# Patient Record
Sex: MALE | Race: WHITE | Employment: OTHER | URBAN - METROPOLITAN AREA
[De-identification: names, ages, dates, MRNs, and addresses within clinical notes are randomized per-mention and may not be internally consistent; named-entity substitution may affect disease eponyms.]

---

## 2017-01-26 LAB
LEFT VENTRICULAR EJECTION FRACTION MODE: NORMAL
LV EF: 73 %

## 2018-10-16 ENCOUNTER — OFFICE VISIT (OUTPATIENT)
Dept: CARDIOLOGY CLINIC | Age: 68
End: 2018-10-16
Payer: MEDICARE

## 2018-10-16 VITALS
HEIGHT: 67 IN | DIASTOLIC BLOOD PRESSURE: 70 MMHG | WEIGHT: 170 LBS | BODY MASS INDEX: 26.68 KG/M2 | SYSTOLIC BLOOD PRESSURE: 132 MMHG | RESPIRATION RATE: 16 BRPM | HEART RATE: 51 BPM

## 2018-10-16 DIAGNOSIS — I25.10 CORONARY ARTERY DISEASE INVOLVING NATIVE CORONARY ARTERY OF NATIVE HEART WITHOUT ANGINA PECTORIS: Primary | ICD-10-CM

## 2018-10-16 PROCEDURE — 1101F PT FALLS ASSESS-DOCD LE1/YR: CPT | Performed by: NURSE PRACTITIONER

## 2018-10-16 PROCEDURE — 4040F PNEUMOC VAC/ADMIN/RCVD: CPT | Performed by: NURSE PRACTITIONER

## 2018-10-16 PROCEDURE — 1036F TOBACCO NON-USER: CPT | Performed by: NURSE PRACTITIONER

## 2018-10-16 PROCEDURE — G8598 ASA/ANTIPLAT THER USED: HCPCS | Performed by: NURSE PRACTITIONER

## 2018-10-16 PROCEDURE — 93000 ELECTROCARDIOGRAM COMPLETE: CPT | Performed by: NURSE PRACTITIONER

## 2018-10-16 PROCEDURE — G8484 FLU IMMUNIZE NO ADMIN: HCPCS | Performed by: NURSE PRACTITIONER

## 2018-10-16 PROCEDURE — G8427 DOCREV CUR MEDS BY ELIG CLIN: HCPCS | Performed by: NURSE PRACTITIONER

## 2018-10-16 PROCEDURE — 1123F ACP DISCUSS/DSCN MKR DOCD: CPT | Performed by: NURSE PRACTITIONER

## 2018-10-16 PROCEDURE — G8419 CALC BMI OUT NRM PARAM NOF/U: HCPCS | Performed by: NURSE PRACTITIONER

## 2018-10-16 PROCEDURE — 3017F COLORECTAL CA SCREEN DOC REV: CPT | Performed by: NURSE PRACTITIONER

## 2018-10-16 PROCEDURE — 99213 OFFICE O/P EST LOW 20 MIN: CPT | Performed by: NURSE PRACTITIONER

## 2019-01-29 ENCOUNTER — HOSPITAL ENCOUNTER (EMERGENCY)
Age: 69
Discharge: HOME | End: 2019-01-29
Attending: EMERGENCY MEDICINE
Payer: MEDICARE

## 2019-01-29 ENCOUNTER — APPOINTMENT (OUTPATIENT)
Dept: GENERAL RADIOLOGY | Age: 69
End: 2019-01-29
Payer: MEDICARE

## 2019-01-29 VITALS
BODY MASS INDEX: 26.37 KG/M2 | DIASTOLIC BLOOD PRESSURE: 69 MMHG | RESPIRATION RATE: 16 BRPM | HEART RATE: 54 BPM | HEIGHT: 67 IN | OXYGEN SATURATION: 97 % | TEMPERATURE: 96.8 F | WEIGHT: 168 LBS | SYSTOLIC BLOOD PRESSURE: 124 MMHG

## 2019-01-29 DIAGNOSIS — R07.89 CHEST WALL PAIN: Primary | ICD-10-CM

## 2019-01-29 LAB
ALBUMIN SERPL-MCNC: 4.4 G/DL (ref 3.5–5.2)
ALP BLD-CCNC: 52 U/L (ref 40–129)
ALT SERPL-CCNC: 23 U/L (ref 0–40)
ANION GAP SERPL CALCULATED.3IONS-SCNC: 8 MMOL/L (ref 7–16)
AST SERPL-CCNC: 23 U/L (ref 0–39)
BASOPHILS ABSOLUTE: 0.04 E9/L (ref 0–0.2)
BASOPHILS RELATIVE PERCENT: 0.5 % (ref 0–2)
BILIRUB SERPL-MCNC: 0.7 MG/DL (ref 0–1.2)
BUN BLDV-MCNC: 17 MG/DL (ref 8–23)
CALCIUM SERPL-MCNC: 9.1 MG/DL (ref 8.6–10.2)
CHLORIDE BLD-SCNC: 97 MMOL/L (ref 98–107)
CO2: 30 MMOL/L (ref 22–29)
CREAT SERPL-MCNC: 1.3 MG/DL (ref 0.7–1.2)
EKG ATRIAL RATE: 60 BPM
EKG P AXIS: 81 DEGREES
EKG P-R INTERVAL: 168 MS
EKG Q-T INTERVAL: 390 MS
EKG QRS DURATION: 92 MS
EKG QTC CALCULATION (BAZETT): 390 MS
EKG R AXIS: 66 DEGREES
EKG T AXIS: 82 DEGREES
EKG VENTRICULAR RATE: 60 BPM
EOSINOPHILS ABSOLUTE: 0.28 E9/L (ref 0.05–0.5)
EOSINOPHILS RELATIVE PERCENT: 3.3 % (ref 0–6)
GFR AFRICAN AMERICAN: >60
GFR NON-AFRICAN AMERICAN: 55 ML/MIN/1.73
GLUCOSE BLD-MCNC: 86 MG/DL (ref 74–99)
HCT VFR BLD CALC: 44.2 % (ref 37–54)
HEMOGLOBIN: 14.8 G/DL (ref 12.5–16.5)
IMMATURE GRANULOCYTES #: 0.03 E9/L
IMMATURE GRANULOCYTES %: 0.4 % (ref 0–5)
LYMPHOCYTES ABSOLUTE: 1.93 E9/L (ref 1.5–4)
LYMPHOCYTES RELATIVE PERCENT: 22.5 % (ref 20–42)
MAGNESIUM: 2.2 MG/DL (ref 1.6–2.6)
MCH RBC QN AUTO: 28.4 PG (ref 26–35)
MCHC RBC AUTO-ENTMCNC: 33.5 % (ref 32–34.5)
MCV RBC AUTO: 84.8 FL (ref 80–99.9)
MONOCYTES ABSOLUTE: 0.86 E9/L (ref 0.1–0.95)
MONOCYTES RELATIVE PERCENT: 10 % (ref 2–12)
NEUTROPHILS ABSOLUTE: 5.43 E9/L (ref 1.8–7.3)
NEUTROPHILS RELATIVE PERCENT: 63.3 % (ref 43–80)
PDW BLD-RTO: 12.5 FL (ref 11.5–15)
PLATELET # BLD: 230 E9/L (ref 130–450)
PMV BLD AUTO: 9.1 FL (ref 7–12)
POTASSIUM SERPL-SCNC: 4 MMOL/L (ref 3.5–5)
RBC # BLD: 5.21 E12/L (ref 3.8–5.8)
SODIUM BLD-SCNC: 135 MMOL/L (ref 132–146)
TOTAL PROTEIN: 6.8 G/DL (ref 6.4–8.3)
TROPONIN: <0.01 NG/ML (ref 0–0.03)
WBC # BLD: 8.6 E9/L (ref 4.5–11.5)

## 2019-01-29 PROCEDURE — 6370000000 HC RX 637 (ALT 250 FOR IP): Performed by: NURSE PRACTITIONER

## 2019-01-29 PROCEDURE — 36415 COLL VENOUS BLD VENIPUNCTURE: CPT

## 2019-01-29 PROCEDURE — 80053 COMPREHEN METABOLIC PANEL: CPT

## 2019-01-29 PROCEDURE — 83735 ASSAY OF MAGNESIUM: CPT

## 2019-01-29 PROCEDURE — 71046 X-RAY EXAM CHEST 2 VIEWS: CPT

## 2019-01-29 PROCEDURE — 99285 EMERGENCY DEPT VISIT HI MDM: CPT

## 2019-01-29 PROCEDURE — 6360000002 HC RX W HCPCS: Performed by: EMERGENCY MEDICINE

## 2019-01-29 PROCEDURE — 85025 COMPLETE CBC W/AUTO DIFF WBC: CPT

## 2019-01-29 PROCEDURE — 96374 THER/PROPH/DIAG INJ IV PUSH: CPT

## 2019-01-29 PROCEDURE — 84484 ASSAY OF TROPONIN QUANT: CPT

## 2019-01-29 PROCEDURE — 93005 ELECTROCARDIOGRAM TRACING: CPT | Performed by: NURSE PRACTITIONER

## 2019-01-29 RX ORDER — ASPIRIN 81 MG/1
243 TABLET, CHEWABLE ORAL ONCE
Status: COMPLETED | OUTPATIENT
Start: 2019-01-29 | End: 2019-01-29

## 2019-01-29 RX ORDER — KETOROLAC TROMETHAMINE 30 MG/ML
15 INJECTION, SOLUTION INTRAMUSCULAR; INTRAVENOUS ONCE
Status: COMPLETED | OUTPATIENT
Start: 2019-01-29 | End: 2019-01-29

## 2019-01-29 RX ADMIN — KETOROLAC TROMETHAMINE 15 MG: 30 INJECTION, SOLUTION INTRAMUSCULAR; INTRAVENOUS at 20:27

## 2019-01-29 RX ADMIN — ASPIRIN 81 MG 243 MG: 81 TABLET ORAL at 18:48

## 2019-01-29 ASSESSMENT — PAIN SCALES - GENERAL
PAINLEVEL_OUTOF10: 6
PAINLEVEL_OUTOF10: 3
PAINLEVEL_OUTOF10: 6

## 2019-01-29 ASSESSMENT — PAIN DESCRIPTION - LOCATION: LOCATION: CHEST

## 2019-01-29 ASSESSMENT — PAIN DESCRIPTION - PAIN TYPE: TYPE: ACUTE PAIN

## 2019-01-29 ASSESSMENT — PAIN DESCRIPTION - DESCRIPTORS: DESCRIPTORS: ACHING;DISCOMFORT

## 2019-05-08 ENCOUNTER — HOSPITAL ENCOUNTER (EMERGENCY)
Age: 69
Discharge: HOME | End: 2019-05-08
Attending: EMERGENCY MEDICINE
Payer: MEDICARE

## 2019-05-08 ENCOUNTER — APPOINTMENT (OUTPATIENT)
Dept: ULTRASOUND IMAGING | Age: 69
End: 2019-05-08
Payer: MEDICARE

## 2019-05-08 VITALS
HEART RATE: 59 BPM | OXYGEN SATURATION: 95 % | TEMPERATURE: 97.7 F | DIASTOLIC BLOOD PRESSURE: 80 MMHG | SYSTOLIC BLOOD PRESSURE: 165 MMHG | RESPIRATION RATE: 16 BRPM

## 2019-05-08 DIAGNOSIS — N43.3 HYDROCELE IN ADULT: Primary | ICD-10-CM

## 2019-05-08 DIAGNOSIS — T14.8XXA MUSCLE STRAIN: ICD-10-CM

## 2019-05-08 DIAGNOSIS — N50.812 TESTICULAR PAIN, LEFT: ICD-10-CM

## 2019-05-08 LAB
BILIRUBIN URINE: NEGATIVE
BLOOD, URINE: NEGATIVE
CLARITY: CLEAR
COLOR: YELLOW
GLUCOSE URINE: NEGATIVE MG/DL
KETONES, URINE: NEGATIVE MG/DL
LEUKOCYTE ESTERASE, URINE: NEGATIVE
NITRITE, URINE: NEGATIVE
PH UA: 5.5 (ref 5–9)
PROTEIN UA: NEGATIVE MG/DL
SPECIFIC GRAVITY UA: 1.01 (ref 1–1.03)
UROBILINOGEN, URINE: 0.2 E.U./DL

## 2019-05-08 PROCEDURE — 76870 US EXAM SCROTUM: CPT

## 2019-05-08 PROCEDURE — 99284 EMERGENCY DEPT VISIT MOD MDM: CPT

## 2019-05-08 PROCEDURE — 81003 URINALYSIS AUTO W/O SCOPE: CPT

## 2019-05-08 RX ORDER — IBUPROFEN 600 MG/1
600 TABLET ORAL EVERY 6 HOURS PRN
Qty: 360 TABLET | Refills: 0 | Status: SHIPPED | OUTPATIENT
Start: 2019-05-08 | End: 2022-02-25

## 2019-05-08 ASSESSMENT — ENCOUNTER SYMPTOMS
DIARRHEA: 0
ABDOMINAL PAIN: 0
SHORTNESS OF BREATH: 0
RESPIRATORY NEGATIVE: 1
COUGH: 0
GASTROINTESTINAL NEGATIVE: 1
VOMITING: 0
SORE THROAT: 0
NAUSEA: 0
EYES NEGATIVE: 1
CONSTIPATION: 0

## 2019-05-08 ASSESSMENT — PAIN DESCRIPTION - FREQUENCY: FREQUENCY: INTERMITTENT

## 2019-05-08 ASSESSMENT — PAIN SCALES - GENERAL: PAINLEVEL_OUTOF10: 4

## 2019-05-08 ASSESSMENT — PAIN DESCRIPTION - PAIN TYPE: TYPE: ACUTE PAIN

## 2019-05-08 ASSESSMENT — PAIN DESCRIPTION - DESCRIPTORS: DESCRIPTORS: DULL

## 2019-05-08 ASSESSMENT — PAIN DESCRIPTION - LOCATION: LOCATION: GROIN

## 2019-05-08 ASSESSMENT — PAIN DESCRIPTION - ORIENTATION: ORIENTATION: LEFT

## 2019-05-08 NOTE — ED PROVIDER NOTES
This is 79years old male who presented to our emergency room with a chief complaint of left testicular and left groin pain or pass several days. Patient stated he was working in his backyard moving swimming pool equipment prior to onset of symptoms. Patient denies any other trauma. Patient denies any direct injury to genitourinary testicular area. She denies urinary symptoms. No penile discharge. No fever or vomiting. No constipation or diarrhea. No abdominal pain. Patient never had testicular pain in the past.  No history of testicular cancer. The history is provided by the patient. No  was used. Testicle Pain    The current episode started 6 to 7 days ago. The onset was gradual. The problem occurs rarely. The problem has been unchanged. The problem affects the left side. There is no reported injury. The color of the testicles is normal. There was no injury mechanism. The pain is mild. Nothing relieves the symptoms. Nothing aggravates the symptoms. Associated symptoms include testicular pain. Pertinent negatives include no chest pain, no anorexia, no chills, no fever, no abdominal pain, no constipation, no diarrhea, no nausea, no vomiting, no discolored urine, no dysuria, no frequency, no genital lesions, no hematuria, no hesitancy, no painful intercourse, no penile discharge, no penile pain, no urgency, no urinary burning, no urinary retention, no headaches, no sore throat, no flank pain, no joint pain, no joint swelling, no cough, no shortness of breath and no rash. He is experiencing no difficulties urinating. He is not sexually active. His past medical history does not include STD, testicular torsion, torsion of appendix testis, epididymitis, hydrocele, varicocele, inguinal hernia, femoral hernia, cryptorchidism, UTI or gallstones. There were no sick contacts. He has received no recent medical care. Review of Systems   Constitutional: Negative for chills and fever. HENT: Negative. Negative for sore throat. Eyes: Negative. Respiratory: Negative. Negative for cough and shortness of breath. Cardiovascular: Negative. Negative for chest pain. Gastrointestinal: Negative. Negative for abdominal pain, anorexia, constipation, diarrhea, nausea and vomiting. Genitourinary: Positive for testicular pain. Negative for dysuria, flank pain, frequency, hematuria, hesitancy, penile discharge, penile pain and urgency. Musculoskeletal: Negative. Negative for joint pain. Skin: Negative for rash. Neurological: Negative. Negative for headaches. Psychiatric/Behavioral: Negative. All other systems reviewed and are negative. Physical Exam   Constitutional: He is oriented to person, place, and time. He appears well-developed and well-nourished. He appears distressed (Mild). HENT:   Head: Normocephalic and atraumatic. Mouth/Throat: No oropharyngeal exudate. Eyes: Pupils are equal, round, and reactive to light. Conjunctivae and EOM are normal. No scleral icterus. Neck: Normal range of motion. Neck supple. Cardiovascular: Normal rate, regular rhythm and normal heart sounds. Pulmonary/Chest: Effort normal and breath sounds normal. No stridor. No respiratory distress. Abdominal: Soft. Bowel sounds are normal. He exhibits no distension. There is no tenderness. There is no guarding. Genitourinary:   Genitourinary Comments: Positive mild tenderness upon palpation over epididymis and also left testicle. As far as I could tell and examined I did not notice any scrotal hernia. No signs of rash. Musculoskeletal: Normal range of motion. He exhibits no edema, tenderness or deformity. Neurological: He is alert and oriented to person, place, and time. He displays normal reflexes. No cranial nerve deficit. Coordination normal.   Skin: Skin is warm and dry. Capillary refill takes less than 2 seconds. No rash noted. He is not diaphoretic. No erythema. No pallor. has a 42.00 pack-year smoking history. He quit smokeless tobacco use about 34 years ago. His smokeless tobacco use included chew. He reports that he drinks about 8.4 oz of alcohol per week. He reports that he does not use drugs. Family History: family history includes Alzheimer's Disease in his mother; Cancer in his father; Hypertension in his father. The patients home medications have been reviewed. Allergies: Corticosteroids    -------------------------------------------------- RESULTS -------------------------------------------------  Labs:  Results for orders placed or performed during the hospital encounter of 05/08/19   Urinalysis, reflex to microscopic   Result Value Ref Range    Color, UA Yellow Straw/Yellow    Clarity, UA Clear Clear    Glucose, Ur Negative Negative mg/dL    Bilirubin Urine Negative Negative    Ketones, Urine Negative Negative mg/dL    Specific Gravity, UA 1.010 1.005 - 1.030    Blood, Urine Negative Negative    pH, UA 5.5 5.0 - 9.0    Protein, UA Negative Negative mg/dL    Urobilinogen, Urine 0.2 <2.0 E.U./dL    Nitrite, Urine Negative Negative    Leukocyte Esterase, Urine Negative Negative       Radiology:  US SCROTUM AND TESTICLES   Final Result   Small bilateral hydroceles   Small left varicocele   Bilateral small epididymal cysts or spermatoceles measuring a maximum   of 4 mm             ------------------------- NURSING NOTES AND VITALS REVIEWED ---------------------------  Date / Time Roomed:  5/8/2019 10:21 AM  ED Bed Assignment:  30/30    The nursing notes within the ED encounter and vital signs as below have been reviewed.    BP (!) 165/80   Pulse 59   Temp 97.7 °F (36.5 °C) (Oral)   Resp 16   SpO2 95%   Oxygen Saturation Interpretation: Normal      ------------------------------------------ PROGRESS NOTES ------------------------------------------  I have spoken with the patient and discussed todays results, in addition to providing specific details for the plan of

## 2019-10-16 ENCOUNTER — OFFICE VISIT (OUTPATIENT)
Dept: CARDIOLOGY CLINIC | Age: 69
End: 2019-10-16
Payer: MEDICARE

## 2019-10-16 VITALS
WEIGHT: 172.8 LBS | RESPIRATION RATE: 12 BRPM | BODY MASS INDEX: 27.12 KG/M2 | DIASTOLIC BLOOD PRESSURE: 68 MMHG | SYSTOLIC BLOOD PRESSURE: 130 MMHG | HEIGHT: 67 IN | HEART RATE: 53 BPM

## 2019-10-16 DIAGNOSIS — Z98.61 HISTORY OF PTCA: ICD-10-CM

## 2019-10-16 DIAGNOSIS — I25.2 HISTORY OF ST ELEVATION MYOCARDIAL INFARCTION (STEMI): ICD-10-CM

## 2019-10-16 DIAGNOSIS — M19.90 ARTHRITIS: ICD-10-CM

## 2019-10-16 DIAGNOSIS — E78.5 DYSLIPIDEMIA: ICD-10-CM

## 2019-10-16 DIAGNOSIS — I10 ESSENTIAL HYPERTENSION: ICD-10-CM

## 2019-10-16 DIAGNOSIS — I25.10 CORONARY ARTERY DISEASE INVOLVING NATIVE CORONARY ARTERY OF NATIVE HEART WITHOUT ANGINA PECTORIS: Primary | ICD-10-CM

## 2019-10-16 PROCEDURE — 4040F PNEUMOC VAC/ADMIN/RCVD: CPT | Performed by: INTERNAL MEDICINE

## 2019-10-16 PROCEDURE — G8598 ASA/ANTIPLAT THER USED: HCPCS | Performed by: INTERNAL MEDICINE

## 2019-10-16 PROCEDURE — 1123F ACP DISCUSS/DSCN MKR DOCD: CPT | Performed by: INTERNAL MEDICINE

## 2019-10-16 PROCEDURE — 93000 ELECTROCARDIOGRAM COMPLETE: CPT | Performed by: INTERNAL MEDICINE

## 2019-10-16 PROCEDURE — G8427 DOCREV CUR MEDS BY ELIG CLIN: HCPCS | Performed by: INTERNAL MEDICINE

## 2019-10-16 PROCEDURE — G8484 FLU IMMUNIZE NO ADMIN: HCPCS | Performed by: INTERNAL MEDICINE

## 2019-10-16 PROCEDURE — 1036F TOBACCO NON-USER: CPT | Performed by: INTERNAL MEDICINE

## 2019-10-16 PROCEDURE — G8419 CALC BMI OUT NRM PARAM NOF/U: HCPCS | Performed by: INTERNAL MEDICINE

## 2019-10-16 PROCEDURE — 99213 OFFICE O/P EST LOW 20 MIN: CPT | Performed by: INTERNAL MEDICINE

## 2019-10-16 PROCEDURE — 3017F COLORECTAL CA SCREEN DOC REV: CPT | Performed by: INTERNAL MEDICINE

## 2020-11-18 ENCOUNTER — OFFICE VISIT (OUTPATIENT)
Dept: CARDIOLOGY CLINIC | Age: 70
End: 2020-11-18
Payer: MEDICARE

## 2020-11-18 VITALS
HEART RATE: 53 BPM | HEIGHT: 67 IN | SYSTOLIC BLOOD PRESSURE: 136 MMHG | WEIGHT: 175 LBS | RESPIRATION RATE: 16 BRPM | DIASTOLIC BLOOD PRESSURE: 78 MMHG | BODY MASS INDEX: 27.47 KG/M2

## 2020-11-18 PROCEDURE — G8417 CALC BMI ABV UP PARAM F/U: HCPCS | Performed by: INTERNAL MEDICINE

## 2020-11-18 PROCEDURE — 93000 ELECTROCARDIOGRAM COMPLETE: CPT | Performed by: INTERNAL MEDICINE

## 2020-11-18 PROCEDURE — 3017F COLORECTAL CA SCREEN DOC REV: CPT | Performed by: INTERNAL MEDICINE

## 2020-11-18 PROCEDURE — 4040F PNEUMOC VAC/ADMIN/RCVD: CPT | Performed by: INTERNAL MEDICINE

## 2020-11-18 PROCEDURE — G8427 DOCREV CUR MEDS BY ELIG CLIN: HCPCS | Performed by: INTERNAL MEDICINE

## 2020-11-18 PROCEDURE — 99213 OFFICE O/P EST LOW 20 MIN: CPT | Performed by: INTERNAL MEDICINE

## 2020-11-18 PROCEDURE — 1036F TOBACCO NON-USER: CPT | Performed by: INTERNAL MEDICINE

## 2020-11-18 PROCEDURE — G8484 FLU IMMUNIZE NO ADMIN: HCPCS | Performed by: INTERNAL MEDICINE

## 2020-11-18 PROCEDURE — 1123F ACP DISCUSS/DSCN MKR DOCD: CPT | Performed by: INTERNAL MEDICINE

## 2020-11-19 NOTE — PROGRESS NOTES
OFFICE VISIT        PRIMARY CARE PHYSICIAN:    Boy Quiñones MD       ALLERGIES / SENSITIVITIES:    Allergies   Allergen Reactions    Corticosteroids      ABDOMINAL CRAMPS        REVIEWED MEDICATIONS:      Current Outpatient Medications:     lisinopril-hydrochlorothiazide (PRINZIDE;ZESTORETIC) 10-12.5 MG per tablet, Take 1 tablet by mouth daily, Disp: 30 tablet, Rfl: 3    nitroGLYCERIN (NITROSTAT) 0.4 MG SL tablet, Place 0.4 mg under the tongue every 5 minutes as needed for Chest pain., Disp: , Rfl:     cyanocobalamin 1000 MCG/ML injection, Inject  into the muscle every 3 months., Disp: , Rfl:     rosuvastatin (CRESTOR) 40 MG tablet, Take 40 mg by mouth every evening., Disp: , Rfl:     metoprolol (TOPROL-XL) 25 MG XL tablet, Take 25 mg by mouth daily. , Disp: , Rfl:     Aspirin (ASPIR-81 PO), Take 81 mg by mouth daily. , Disp: , Rfl:     ibuprofen (ADVIL;MOTRIN) 600 MG tablet, Take 1 tablet by mouth every 6 hours as needed for Pain, Disp: 360 tablet, Rfl: 0        S: REASON FOR VISIT:    Coronary artery disease. Chung Gold is a pleasant, 73-year-old male with cardiovascular history as described below. He remains stable from a cardiac standpoint. He denies chest pain or dyspnea with his daily activities. He denies orthopnea, PND's or lower extremity swelling, palpitations, dizziness, presyncope or syncope. REVIEW OF SYSTEMS:    CONSTITUTIONAL:  Denies fevers, chills, night sweats or fatigue. HEENT:  Denies any unusual headaches.  Denies recent changes in hearing or vision.  Denies dysphagia, hoarseness, hemoptysis, hematemesis or epistaxis. ENDOCRINE:  Denies polyphagia, polydipsia or polyuria.  Denies heat or cold intolerance. MUSCULOSKELETAL:  Denies any significant arthralgias or myalgias. SKIN:  Denies rashes, ulcers or itching. HEME/LYMPH:  Denies any palpable lymph nodes, bleeding but does have bruisability. HEART:  As above. LUNGS:  Denies cough or sputum production.   GI: Denies abdominal pain, nausea, vomiting,diarrhea, constipation, rectal bleeding or tarry stools. :  Denies hematuria or dysuria. PSYCHIATRIC:  Denies mood changes, anxiety or depression. NEUROLOGIC:  Denies memory loss, motor weakness, numbness, tingling or tremors.                    CARDIOVASCULAR HISTORY:    1.  Coronary artery disease:  a.  ST elevation myocardial infarction/ventricular fibrillation arrest 10/02/11.  b.  10/02/11: Cardiac catheterization and angioplasty: Left main no significant disease. LAD 30% proximal vessel narrowing and 30-40% very distal vessel narrowing at the level of the apex. LCX small nondominant vessel with no significant disease. RCA large vessel with around 30% midvessel narrowing and a filling defect consistent with a thrombus in the distal vessel. Normal left ventricular size and function. Mildly elevated left ventricular end-diastolic pressure suggestive of diastolic dysfunction. EF 65% with no mitral regurgitation. Successful balloon angioplasty with deployment of a drug-eluting coronary stent to the distal right coronary artery with very good results with adjunctive ReoPro therapy. Closure of right femoral artery access site with Angio-Seal device. c.  Treadmill nuclear stress test done on 2017: Viral protocol.  8 minutes.  92% MPHR.  No chest pain.  No ischemic EKG changes.  Nuclear images were read as showing a small fixed defect involving the inferior wall with a small area of reversibility involving the inferolateral wall with the gated views showing no regional wall motion abnormality with an EF of 73%.    2.  Hypertension. 3.  Dyslipidemia. 4.  Former tobacco abuse. 5.  Mild-to-moderate alcohol consumption.     PAST MEDICAL HISTORY:   1.  Bilateral cataract surgery. 2.  Arthritis. 3.  Right elbow cyst removed. 4.  Tooth abscess  (treated with antibiotics with need for extraction).   5. Left rotator cuff repair 2017     FAMILY HISTORY:   Mother  at age 80, suffered from Alzheimer's disease. Father  at age 80, had hypertension and CA prostate.      SOCIAL HISTORY:   Smoked 1 PPD x42 years: Quit smoking in . Alcohol intake is 2-3 beers per day. No history of illicit drug use. Caffeine intake is 2 CPD. Low-salt diet.        O:  COMPLETE PHYSICAL EXAM:      /78   Pulse 53   Resp 16   Ht 5' 7\" (1.702 m)   Wt 175 lb (79.4 kg)   BMI 27.41 kg/m²      General:          Well-developed, well-nourished male in no distress. HEENT:           Normocephalic and atraumatic head. No JVD. No carotid bruits. Carotid upstrokes normal bilaterally.  No thyromegaly.    Sclerae not icteric.  No xanthelasmas.  Mucous membranes moist.  Chest:             Symmetrical and nontender.  No deformities. Lungs:             Clear to auscultation bilaterally. Heart:              Normal S1 and S2.  No S3 or S4.  No murmurs or rubs. Abdomen:        Soft, nontender without organomegaly or masses.  No bruits.  Normal bowel sounds. Extremities:     No edema. Pulses normal.  Skin:                Normal turgor. No rashes or ulcers noted. Neurologic:      Oriented x3.  No motor or sensory deficits detected. REVIEW OF DIAGNOSTIC TESTS:    1. Blood tests done on 2020 reviewed:  CBC unremarkable. BUN 21, creatinine 1.31, GFR 56. LFT's unremarkable. TSH normal.  Total cholesterol 151, triglycerides 68. HDL 68. LDL 69.    2.  EKG done today showed sinus bradycardia with a heart rate of 53 bpm, but was otherwise within normal limits. ASSESSMENT / DIAGNOSIS:   1.  Coronary artery disease:  Clinically stable. 2.  Hypertension:  Adequately controlled. 3.  Dyslipidemia: On statin therapy. 4.  Arthritis. 5.  Chronic kidney disease, stage 3.   6.  Sinus bradycardia on low dose beta blocker therapy.         TREATMENT PLAN:  1. Reassure. 2.  Continue current medications. 3.  Patient advised to remain active.   4.  Treadmill nuclear stress test for follow up coronary artery disease. 5.  Follow up in 1 year or on a prn basis pending the results of the stress test.         [facility] CARDIOLOGY  LENNY Toscano [address]  [place] [address]  [phone]

## 2020-12-01 ENCOUNTER — TELEPHONE (OUTPATIENT)
Dept: CARDIOLOGY | Age: 70
End: 2020-12-01

## 2020-12-01 NOTE — TELEPHONE ENCOUNTER
12/01/20 1301 Spoke with Brad Cortes Reminder Call for  12/04/20  Nuclear Stress test @ 1953  included Pre procedure stress instructions and COVID check list.

## 2020-12-04 ENCOUNTER — HOSPITAL ENCOUNTER (OUTPATIENT)
Dept: CARDIOLOGY | Age: 70
Discharge: HOME | End: 2020-12-04
Payer: MEDICARE

## 2020-12-04 VITALS
WEIGHT: 175 LBS | BODY MASS INDEX: 27.47 KG/M2 | DIASTOLIC BLOOD PRESSURE: 67 MMHG | RESPIRATION RATE: 18 BRPM | HEART RATE: 53 BPM | OXYGEN SATURATION: 97 % | SYSTOLIC BLOOD PRESSURE: 110 MMHG | HEIGHT: 67 IN

## 2020-12-04 LAB
LV EF: 71 %
LVEF MODALITY: NORMAL

## 2020-12-04 PROCEDURE — 93017 CV STRESS TEST TRACING ONLY: CPT

## 2020-12-04 PROCEDURE — 2580000003 HC RX 258: Performed by: INTERNAL MEDICINE

## 2020-12-04 PROCEDURE — 3430000000 HC RX DIAGNOSTIC RADIOPHARMACEUTICAL: Performed by: INTERNAL MEDICINE

## 2020-12-04 PROCEDURE — A9502 TC99M TETROFOSMIN: HCPCS | Performed by: INTERNAL MEDICINE

## 2020-12-04 PROCEDURE — 78452 HT MUSCLE IMAGE SPECT MULT: CPT

## 2020-12-04 RX ORDER — SODIUM CHLORIDE 0.9 % (FLUSH) 0.9 %
10 SYRINGE (ML) INJECTION PRN
Status: DISCONTINUED | OUTPATIENT
Start: 2020-12-04 | End: 2020-12-05 | Stop reason: HOSPADM

## 2020-12-04 RX ADMIN — TETROFOSMIN 26.5 MILLICURIE: 0.23 INJECTION, POWDER, LYOPHILIZED, FOR SOLUTION INTRAVENOUS at 09:02

## 2020-12-04 RX ADMIN — Medication 10 ML: at 07:49

## 2020-12-04 RX ADMIN — Medication 10 ML: at 09:02

## 2020-12-04 RX ADMIN — TETROFOSMIN 8.4 MILLICURIE: 0.23 INJECTION, POWDER, LYOPHILIZED, FOR SOLUTION INTRAVENOUS at 07:49

## 2020-12-04 NOTE — PROCEDURES
[id number] [address],[facility] - [address] DANIKA patel, [address] [place] [address]  [phone]                  Exercise Stress Nuclear Gated SPECT Study    Name: [address] Account Number: [de-identified]    :  1950      Sex: male              Date of Study:  2020    Height: 5' 7\" (170.2 cm)  Weight: 175 lb (79.4 kg)     Ordering Provider: Ravi Clay          PCP: Jerson Bowens MD      Cardiologist: Ravi Clay                        Interpreting Physician: Laura Wolf MD  _________________________________________________________________________________    Indication:   Evaluation of extent and severity of coronary artery disease    Clinical History:   Patient has prior history of coronary artery disease. Resting ECG:    Sinus bradycardia but was otherwise within normal limits    Exercise: The patient exercised using a Viral protocol, completing 7:30 minutes and reaching an estimated work load of 87.2 metabolic equivalents (METS). Resting HR was 56. Peak exercise heart rate was 133 ( 88% of maximum predicted heart rate for age). Baseline /67. Peak exercise /80. The blood pressure response to exercise was normal, however in recovery patient became diaphoretic,color pale, and dizzy, BP 88/60, HR 59, PULSE OX 96%, denies chest pain and shortness of breath, patient was laid down flat with feet elevated, oral fluids were given,Dr Surinder Sorenson notified and assessed patient, continue with oral fluids and 12 lead ekg done,BP 90/60,HR 59,PULSE OX 96%,patient feeling better, sitting up now BP 90/60, HR 68, PULSE OX 97% ,states dizziness is gone, color normal, denies chest pain and shortness of breath, patient advised to call office if any further problems. Exercise was terminated due to heart rate attained. The patient experienced no chest pain with exercise.        Pulse oximetry was used to monitor oxygen saturation during the stress test.  The study was performed on Room Air. The resting pulse oximeter was 97%. The post stress O2 saturation seen during exercise was 96 %. Exercise ECG:   The patient demonstrated no arrhythmias during exercise. With exercise, there were no ST segment changes of significance at the heart rate achieved. IMAGING: Myocardial perfusion imaging was performed at rest 30-35 minutes following the intravenous injection of 8.4 mCi of (Tc-tetrofosmin) followed by 10 ml of Normal Saline. At peak exercise, the patient was injected intravenously with 26.5 mCi of (Tc-tetrofosmin) followed by 10 ml of Normal Saline. Gated post-stress tomographic imaging was performed 20-25 minutes after stress. FINDINGS: The overall quality of the study was good. Left ventricular cavity size was noted to be normal.    Rotational analog analysis demonstrated abnormal patient motion. The gated SPECT stress imaging in the short, vertical long, and horizontal long axis demonstrated normal homogeneous tracer distribution throughout the myocardium both on the post stress and resting images. IF TEST NORMAL-HIGHLIGHT AND DELETE FOLLOWING SECTIONS:    A mild to moderately severe defect was present in the mid to basal inferior wall extending into the basal septal wall(s) that was  moderate size on the post stress images. The resting images are show no change. Gated SPECT left ventricular ejection fraction was calculated to be 71%, with normal myocardial thickening and wall motion. Impression:    1. Exercise EKG was normal.  2. The patient experienced no chest pain with exercise. 3. The myocardial perfusion imaging was abnormal. The abnormality wa a moderate sized fixed defect in the mid to basal inferior wall extending into the basal inferoseptal wall suggestive of a prior MI\  4. Overall left ventricular systolic function was normal without regional wall motion abnormalities. 5. Exercise capacity was average.    6. Low risk general exercise nuclear stress test.    Thank you for sending your patient to this [facility].      Electronically signed by Awa Bay MD on 12/4/2020 at 12:58 PM

## 2020-12-08 ENCOUNTER — TELEPHONE (OUTPATIENT)
Dept: CARDIOLOGY CLINIC | Age: 70
End: 2020-12-08

## 2021-02-13 ENCOUNTER — IMMUNIZATION (OUTPATIENT)
Dept: PRIMARY CARE CLINIC | Age: 71
End: 2021-02-13
Payer: MEDICARE

## 2021-02-13 PROCEDURE — 91300 COVID-19, PFIZER VACCINE 30MCG/0.3ML DOSE: CPT | Performed by: EMERGENCY MEDICINE

## 2021-02-13 PROCEDURE — 0001A COVID-19, PFIZER VACCINE 30MCG/0.3ML DOSE: CPT | Performed by: EMERGENCY MEDICINE

## 2021-03-08 ENCOUNTER — IMMUNIZATION (OUTPATIENT)
Dept: PRIMARY CARE CLINIC | Age: 71
End: 2021-03-08
Payer: MEDICARE

## 2021-03-08 PROCEDURE — 0002A COVID-19, PFIZER VACCINE 30MCG/0.3ML DOSE: CPT | Performed by: PHYSICIAN ASSISTANT

## 2021-03-08 PROCEDURE — 91300 COVID-19, PFIZER VACCINE 30MCG/0.3ML DOSE: CPT | Performed by: PHYSICIAN ASSISTANT

## 2022-02-25 ENCOUNTER — OFFICE VISIT (OUTPATIENT)
Dept: CARDIOLOGY CLINIC | Age: 72
End: 2022-02-25
Payer: MEDICARE

## 2022-02-25 VITALS
OXYGEN SATURATION: 98 % | RESPIRATION RATE: 16 BRPM | SYSTOLIC BLOOD PRESSURE: 100 MMHG | DIASTOLIC BLOOD PRESSURE: 58 MMHG | HEIGHT: 67 IN | BODY MASS INDEX: 26.15 KG/M2 | HEART RATE: 58 BPM | WEIGHT: 166.6 LBS

## 2022-02-25 DIAGNOSIS — Z86.74 H/O CARDIAC ARREST: ICD-10-CM

## 2022-02-25 DIAGNOSIS — I25.10 CORONARY ARTERY DISEASE INVOLVING NATIVE CORONARY ARTERY OF NATIVE HEART WITHOUT ANGINA PECTORIS: Primary | ICD-10-CM

## 2022-02-25 DIAGNOSIS — Z79.899 ON STATIN THERAPY: ICD-10-CM

## 2022-02-25 DIAGNOSIS — M19.90 ARTHRITIS: ICD-10-CM

## 2022-02-25 DIAGNOSIS — Z86.39 H/O HYPERCHOLESTEROLEMIA: ICD-10-CM

## 2022-02-25 DIAGNOSIS — I25.2 HISTORY OF ACUTE INFERIOR WALL MI: ICD-10-CM

## 2022-02-25 DIAGNOSIS — I10 ESSENTIAL HYPERTENSION: ICD-10-CM

## 2022-02-25 DIAGNOSIS — Z98.61 HISTORY OF PTCA: ICD-10-CM

## 2022-02-25 DIAGNOSIS — R00.1 SINUS BRADYCARDIA: ICD-10-CM

## 2022-02-25 DIAGNOSIS — N18.31 STAGE 3A CHRONIC KIDNEY DISEASE (HCC): ICD-10-CM

## 2022-02-25 PROCEDURE — G8417 CALC BMI ABV UP PARAM F/U: HCPCS | Performed by: INTERNAL MEDICINE

## 2022-02-25 PROCEDURE — G8427 DOCREV CUR MEDS BY ELIG CLIN: HCPCS | Performed by: INTERNAL MEDICINE

## 2022-02-25 PROCEDURE — 1123F ACP DISCUSS/DSCN MKR DOCD: CPT | Performed by: INTERNAL MEDICINE

## 2022-02-25 PROCEDURE — 1036F TOBACCO NON-USER: CPT | Performed by: INTERNAL MEDICINE

## 2022-02-25 PROCEDURE — G8484 FLU IMMUNIZE NO ADMIN: HCPCS | Performed by: INTERNAL MEDICINE

## 2022-02-25 PROCEDURE — 3017F COLORECTAL CA SCREEN DOC REV: CPT | Performed by: INTERNAL MEDICINE

## 2022-02-25 PROCEDURE — 99213 OFFICE O/P EST LOW 20 MIN: CPT | Performed by: INTERNAL MEDICINE

## 2022-02-25 PROCEDURE — 4040F PNEUMOC VAC/ADMIN/RCVD: CPT | Performed by: INTERNAL MEDICINE

## 2022-02-25 PROCEDURE — 93000 ELECTROCARDIOGRAM COMPLETE: CPT | Performed by: INTERNAL MEDICINE

## 2022-02-25 RX ORDER — LATANOPROST 50 UG/ML
1 SOLUTION/ DROPS OPHTHALMIC NIGHTLY
COMMUNITY

## 2022-02-25 RX ORDER — GABAPENTIN 400 MG/1
400 CAPSULE ORAL 3 TIMES DAILY
COMMUNITY

## 2022-02-25 NOTE — PROGRESS NOTES
OFFICE VISIT        PRIMARY CARE PHYSICIAN:    Mark Nguyen MD       ALLERGIES / SENSITIVITIES:    Allergies   Allergen Reactions    Corticosteroids      ABDOMINAL CRAMPS. Pt states it was just one pill one time and has taken steroids without problems since then. REVIEWED MEDICATIONS:      Current Outpatient Medications:     latanoprost (XALATAN) 0.005 % ophthalmic solution, Place 1 drop into the left eye nightly, Disp: , Rfl:     gabapentin (NEURONTIN) 400 MG capsule, Take 400 mg by mouth 3 times daily. , Disp: , Rfl:     lisinopril-hydrochlorothiazide (PRINZIDE;ZESTORETIC) 10-12.5 MG per tablet, Take 1 tablet by mouth daily, Disp: 30 tablet, Rfl: 3    nitroGLYCERIN (NITROSTAT) 0.4 MG SL tablet, Place 0.4 mg under the tongue every 5 minutes as needed for Chest pain., Disp: , Rfl:     cyanocobalamin 1000 MCG/ML injection, Inject into the muscle every 30 days , Disp: , Rfl:     rosuvastatin (CRESTOR) 40 MG tablet, Take 40 mg by mouth every evening., Disp: , Rfl:     metoprolol (TOPROL-XL) 25 MG XL tablet, Take 25 mg by mouth daily. , Disp: , Rfl:     Aspirin (ASPIR-81 PO), Take 81 mg by mouth daily. , Disp: , Rfl:       S: REASON FOR VISIT:    Coronary artery disease. Zo Cheney is a pleasant, 80-year-old male with cardiovascular history as described below. He remains stable from a cardiac standpoint. He denies chest pain or dyspnea with his daily activities. He denies orthopnea, PND's or lower extremity swelling. He denies palpitations, dizziness, presyncope or syncope. He follows up routinely with his PCP. REVIEW OF SYSTEMS:    CONSTITUTIONAL:  Denies fevers, chills, night sweats or fatigue. HEENT:  Denies any unusual headaches.  Denies recent changes in hearing or vision.  Denies dysphagia, hoarseness, hemoptysis, hematemesis or epistaxis. ENDOCRINE:  Denies polyphagia, polydipsia or polyuria.  Denies heat or cold intolerance.   MUSCULOSKELETAL:  Denies any significant arthralgias or myalgias. SKIN:  Denies rashes, ulcers or itching. HEME/LYMPH:  Denies any palpable lymph nodes, bleeding but does have bruisability. HEART:  As above. LUNGS:  Denies cough or sputum production. GI: Denies abdominal pain, nausea, vomiting,diarrhea, constipation, rectal bleeding or tarry stools. :  Denies hematuria or dysuria. PSYCHIATRIC:  Denies mood changes, anxiety or depression. NEUROLOGIC:  Denies memory loss, motor weakness, numbness, tingling or tremors.                    CARDIOVASCULAR HISTORY:    1.  Coronary artery disease:  a.  ST elevation myocardial infarction/ventricular fibrillation arrest 10/02/11.  b.  10/02/11: Cardiac catheterization and angioplasty: Left main no significant disease. LAD 30% proximal vessel narrowing and 30-40% very distal vessel narrowing at the level of the apex. LCX small nondominant vessel with no significant disease. RCA large vessel with around 30% midvessel narrowing and a filling defect consistent with a thrombus in the distal vessel. Normal left ventricular size and function. Mildly elevated left ventricular end-diastolic pressure suggestive of diastolic dysfunction. EF 65% with no mitral regurgitation. Successful balloon angioplasty with deployment of a drug-eluting coronary stent to the distal right coronary artery with very good results with adjunctive ReoPro therapy. Closure of right femoral artery access site with Angio-Seal device. c.  Treadmill nuclear stress test done on 12/4/2020: Viral protocol. 7 minutes and 30 seconds. 88% of the maximum predicted heart rate. 10.1 METS. Physiologic BP response. No chest pain. No ischemic EKG changes.   Nuclear images showed a moderate sized, mild-to-moderately severe defect in the mid to basal inferior wall extending into the basal septal wall consistent with a prior myocardial infarction with no evidence of stress-induced ischemia with the gated views, however, showing normal myocardial thickening and wall motion with a calculated ejection fraction of 71%. Low risk stress test.     2.  Hypertension. 3.  Dyslipidemia. 4.  Former tobacco abuse. 5.  Mild-to-moderate alcohol consumption.     PAST MEDICAL HISTORY:   1.  Bilateral cataract surgery. 2.  Arthritis. 3.  Right elbow cyst removed. 4.  Tooth abscess  (treated with antibiotics with need for extraction). 5. Left rotator cuff repair 2017     FAMILY HISTORY:   Mother  at age 80, suffered from Alzheimer's disease. Father  at age 80, had hypertension and CA prostate.      SOCIAL HISTORY:   Smoked 1 PPD x42 years: Quit smoking in . Alcohol intake is 2-3 beers per day. No history of illicit drug use. Caffeine intake is 2 CPD. Low-salt diet.         O:  COMPLETE PHYSICAL EXAM:      BP (!) 100/58 (Site: Left Upper Arm, Position: Sitting, Cuff Size: Medium Adult)   Pulse 58   Resp 16   Ht 5' 7\" (1.702 m)   Wt 166 lb 9.6 oz (75.6 kg)   SpO2 98%   BMI 26.09 kg/m²      General:          Well-developed, well-nourished male in no distress. HEENT:           Normocephalic and atraumatic head. No JVD. No carotid bruits. Carotid upstrokes normal bilaterally.  No thyromegaly.                     Sclerae not icteric.  No xanthelasmas.  Mucous membranes moist.  Chest:             Symmetrical and nontender.  No deformities. Lungs:             Clear to auscultation bilaterally. Heart:              Normal S1 and S2.  No S3 or S4.  No murmurs or rubs. Abdomen:        Soft, nontender without organomegaly or masses.  No bruits.  Normal bowel sounds. Extremities:     No edema. Pulses normal.  Skin:                Normal turgor. No rashes or ulcers noted. Neurologic:      Oriented x3.  No motor or sensory deficits detected.        REVIEW OF DIAGNOSTIC TESTS:    1. Blood test from 2022 reviewed:  Hemoglobin 13.8.   Hematocrit 41.8, BUN 18, creatinine 1.38, GFR 56, potassium 4.7, LFT's normal.    2.  EKG done today showed sinus bradycardia with a heart rate of 58 bpm but was otherwise unremarkable. 3.  Lipid profile from 4/23/2021 reviewed: Total cholesterol 152, triglycerides 74, HDL 55, LDL 73. ASSESSMENT / DIAGNOSIS:   1.  Coronary artery disease:  Clinically stable. 2.  Hypertension:  Adequately controlled. 3.  Dyslipidemia: On statin therapy. 4.  Arthritis. 5.  Chronic kidney disease, stage 3.   6.  Sinus bradycardia on low dose beta blocker therapy. TREATMENT PLAN:  1. Reassure. 2.  Continue current cardiac medications. 3.  Patient advised to remain active. 4.  Follow up with Cardiology in 1  Year or on a prn basis. [address]  [place] [address]  [phone]

## 2023-02-22 ENCOUNTER — OFFICE VISIT (OUTPATIENT)
Dept: CARDIOLOGY CLINIC | Age: 73
End: 2023-02-22
Payer: MEDICARE

## 2023-02-22 VITALS
HEART RATE: 44 BPM | HEIGHT: 67 IN | BODY MASS INDEX: 25.77 KG/M2 | SYSTOLIC BLOOD PRESSURE: 130 MMHG | DIASTOLIC BLOOD PRESSURE: 68 MMHG | WEIGHT: 164.2 LBS | OXYGEN SATURATION: 99 % | RESPIRATION RATE: 16 BRPM

## 2023-02-22 DIAGNOSIS — R00.1 SINUS BRADYCARDIA: ICD-10-CM

## 2023-02-22 DIAGNOSIS — Z86.39 H/O HYPERCHOLESTEROLEMIA: ICD-10-CM

## 2023-02-22 DIAGNOSIS — N18.31 STAGE 3A CHRONIC KIDNEY DISEASE (HCC): ICD-10-CM

## 2023-02-22 DIAGNOSIS — Z86.74 H/O CARDIAC ARREST: ICD-10-CM

## 2023-02-22 DIAGNOSIS — Z98.61 HISTORY OF PTCA: ICD-10-CM

## 2023-02-22 DIAGNOSIS — I25.10 CORONARY ARTERY DISEASE INVOLVING NATIVE CORONARY ARTERY OF NATIVE HEART WITHOUT ANGINA PECTORIS: Primary | ICD-10-CM

## 2023-02-22 DIAGNOSIS — M19.90 ARTHRITIS: ICD-10-CM

## 2023-02-22 DIAGNOSIS — I10 ESSENTIAL HYPERTENSION: ICD-10-CM

## 2023-02-22 DIAGNOSIS — I25.2 HISTORY OF ACUTE INFERIOR WALL MI: ICD-10-CM

## 2023-02-22 DIAGNOSIS — Z79.899 ON STATIN THERAPY: ICD-10-CM

## 2023-02-22 PROCEDURE — 99214 OFFICE O/P EST MOD 30 MIN: CPT | Performed by: INTERNAL MEDICINE

## 2023-02-22 PROCEDURE — 3078F DIAST BP <80 MM HG: CPT | Performed by: INTERNAL MEDICINE

## 2023-02-22 PROCEDURE — G8484 FLU IMMUNIZE NO ADMIN: HCPCS | Performed by: INTERNAL MEDICINE

## 2023-02-22 PROCEDURE — 1036F TOBACCO NON-USER: CPT | Performed by: INTERNAL MEDICINE

## 2023-02-22 PROCEDURE — G8427 DOCREV CUR MEDS BY ELIG CLIN: HCPCS | Performed by: INTERNAL MEDICINE

## 2023-02-22 PROCEDURE — 3075F SYST BP GE 130 - 139MM HG: CPT | Performed by: INTERNAL MEDICINE

## 2023-02-22 PROCEDURE — 93000 ELECTROCARDIOGRAM COMPLETE: CPT | Performed by: INTERNAL MEDICINE

## 2023-02-22 PROCEDURE — 1123F ACP DISCUSS/DSCN MKR DOCD: CPT | Performed by: INTERNAL MEDICINE

## 2023-02-22 PROCEDURE — 3017F COLORECTAL CA SCREEN DOC REV: CPT | Performed by: INTERNAL MEDICINE

## 2023-02-22 PROCEDURE — G8417 CALC BMI ABV UP PARAM F/U: HCPCS | Performed by: INTERNAL MEDICINE

## 2023-02-22 RX ORDER — DORZOLAMIDE HYDROCHLORIDE AND TIMOLOL MALEATE 20; 5 MG/ML; MG/ML
SOLUTION/ DROPS OPHTHALMIC
COMMUNITY
Start: 2022-12-20

## 2023-02-22 RX ORDER — MELOXICAM 15 MG/1
TABLET ORAL
COMMUNITY
Start: 2023-02-11

## 2023-02-22 RX ORDER — METOPROLOL SUCCINATE 25 MG/1
12.5 TABLET, EXTENDED RELEASE ORAL DAILY
Qty: 30 TABLET | Refills: 5 | Status: SHIPPED | COMMUNITY
Start: 2023-02-22

## 2023-02-22 NOTE — PROGRESS NOTES
OFFICE VISIT        PRIMARY CARE PHYSICIAN:    Bj Hunt MD         ALLERGIES / SENSITIVITIES:    Allergies   Allergen Reactions    Corticosteroids      ABDOMINAL CRAMPS. Pt states it was just one pill one time and has taken steroids without problems since then. REVIEWED MEDICATIONS:      Current Outpatient Medications:     dorzolamide-timolol (COSOPT) 22.3-6.8 MG/ML ophthalmic solution, INSTILL 1 DROP INTO LEFT EYE TWICE DAILY, Disp: , Rfl:     meloxicam (MOBIC) 15 MG tablet, TAKE 1 TABLET BY MOUTH ONCE DAILY, Disp: , Rfl:     metoprolol succinate (TOPROL XL) 25 MG extended release tablet, Take 0.5 tablets by mouth daily, Disp: 30 tablet, Rfl: 5    latanoprost (XALATAN) 0.005 % ophthalmic solution, Place 1 drop into the left eye nightly, Disp: , Rfl:     gabapentin (NEURONTIN) 400 MG capsule, Take 400 mg by mouth 3 times daily. , Disp: , Rfl:     lisinopril-hydrochlorothiazide (PRINZIDE;ZESTORETIC) 10-12.5 MG per tablet, Take 1 tablet by mouth daily, Disp: 30 tablet, Rfl: 3    nitroGLYCERIN (NITROSTAT) 0.4 MG SL tablet, Place 0.4 mg under the tongue every 5 minutes as needed for Chest pain., Disp: , Rfl:     cyanocobalamin 1000 MCG/ML injection, Inject into the muscle every 30 days , Disp: , Rfl:     rosuvastatin (CRESTOR) 40 MG tablet, Take 40 mg by mouth every evening., Disp: , Rfl:     Aspirin (ASPIR-81 PO), Take 81 mg by mouth daily. , Disp: , Rfl:         S: REASON FOR VISIT:    Coronary artery disease. Moraima Wakefield is a pleasant, 82-year-old male with cardiovascular history as described below. He remains stable from a cardiac standpoint. He denies chest pain or dyspnea with his daily activities. He denies orthopnea, PND's or lower extremity swelling, palpitations, dizziness, presyncope or syncope.   He follows routinely with his PCP and he tells me that he had blood tests done in January:  He states that he was told by his PCP that he was going to check his thyroid function and Moraima Wakefield states that he was not called after that with results suggesting that the thyroid function was normal.  Graeme Doherty does have bradycardia with noted marked bradycardia on today's EKG. He also complains of feeling cold all the time. REVIEW OF SYSTEMS:    CONSTITUTIONAL:  Denies fevers, night sweats or fatigue. Does complain of feeling cold. HEENT:  Denies any unusual headaches. Denies recent changes in hearing or vision. Denies dysphagia, hoarseness, hemoptysis, hematemesis or epistaxis. ENDOCRINE:  Denies polyphagia, polydipsia or polyuria. Denies heat or cold intolerance. MUSCULOSKELETAL:  Denies any significant arthralgias or myalgias. SKIN:  Denies rashes, ulcers or itching. HEME/LYMPH:  Denies any palpable lymph nodes, bleeding but does have bruisability. HEART:  As above. LUNGS:  Denies cough or sputum production. GI: Denies abdominal pain, nausea, vomiting,diarrhea, constipation, rectal bleeding or tarry stools. :  Denies hematuria or dysuria. PSYCHIATRIC:  Denies mood changes, anxiety or depression. NEUROLOGIC:  Denies memory loss, motor weakness, numbness, tingling or tremors. CARDIOVASCULAR HISTORY:    1. Coronary artery disease:  a.  ST elevation myocardial infarction/ventricular fibrillation arrest 10/02/11.  b.  10/02/11: Cardiac catheterization and angioplasty: Left main no significant disease. LAD 30% proximal vessel narrowing and 30-40% very distal vessel narrowing at the level of the apex. LCX small nondominant vessel with no significant disease. RCA large vessel with around 30% midvessel narrowing and a filling defect consistent with a thrombus in the distal vessel. Normal left ventricular size and function. Mildly elevated left ventricular end-diastolic pressure suggestive of diastolic dysfunction. EF 65% with no mitral regurgitation.  Successful balloon angioplasty with deployment of a drug-eluting coronary stent to the distal right coronary artery with very good results with adjunctive ReoPro therapy. Closure of right femoral artery access site with Angio-Seal device. c.  Treadmill nuclear stress test done on 2020: Viral protocol. 7 minutes and 30 seconds. 88% of the maximum predicted heart rate. 10.1 METS. Physiologic BP response. No chest pain. No ischemic EKG changes. Nuclear images showed a moderate sized, mild-to-moderately severe defect in the mid to basal inferior wall extending into the basal septal wall consistent with a prior myocardial infarction with no evidence of stress-induced ischemia with the gated views, however, showing normal myocardial thickening and wall motion with a calculated ejection fraction of 71%. Low risk stress test.     2.  Hypertension. 3.  Dyslipidemia. 4.  Former tobacco abuse. 5.  History of mild-to-moderate alcohol consumption. 6.  Sinus bradycardia. PAST MEDICAL HISTORY:   1.  Bilateral cataract surgery. 2.  Arthritis. 3.  Right elbow cyst removed. 4.  Tooth abscess  (treated with antibiotics with need for extraction). 5. Left rotator cuff repair 2017     FAMILY HISTORY:   Mother  at age 80, suffered from Alzheimer's disease. Father  at age 80, had hypertension and CA prostate. SOCIAL HISTORY:   Smoked 1 PPD x42 years: Quit smoking in . Alcohol intake is 2-3 beers per day. No history of illicit drug use. Caffeine intake is 2 CPD. Low-salt diet. O:  COMPLETE PHYSICAL EXAM:      /68 (Site: Right Upper Arm, Position: Sitting, Cuff Size: Medium Adult)   Pulse (!) 44   Resp 16   Ht 5' 7\" (1.702 m)   Wt 164 lb 3.2 oz (74.5 kg)   SpO2 99%   BMI 25.72 kg/m²      General:          Well-developed, well-nourished male in no distress. HEENT:           Normocephalic and atraumatic head. No JVD. No carotid bruits. Carotid upstrokes normal bilaterally. No thyromegaly. Sclerae not icteric. No xanthelasmas.   Mucous membranes moist.  Chest:             Symmetrical and nontender. No deformities. Lungs:             Clear to auscultation bilaterally. Heart:              Normal S1 and S2. No S3 or S4. No murmurs or rubs. Abdomen:        Soft, nontender without organomegaly or masses. No bruits. Normal bowel sounds. Extremities:     No edema. Pulses normal.  Skin:                Normal turgor. No rashes or ulcers noted. Neurologic:      Oriented x3. No motor or sensory deficits detected. REVIEW OF DIAGNOSTIC TESTS:     EKG done today showed marked sinus bradycardia with a heart rate of 44 bpm, but was otherwise unremarkable. ASSESSMENT / DIAGNOSIS:   1. Coronary artery disease:  Clinically stable. 2.  Hypertension:  Adequately controlled. 3.  Dyslipidemia: On statin therapy. 4.  Arthritis. 5.  Chronic kidney disease, stage 3.   6.  Marked sinus bradycardia on low dose beta blocker therapy. TREATMENT PLAN:   Reassure. Decrease Toprol XL to 12.5 mg daily: Monitor heart rate:  consider discontinuing beta blocker therapy if resting heart rate remains below 50. Will obtain recent blood tests done by his PCP for review. Kim Braun was advised to remain active as is. Follow up with Cardiology in 1 year or on a prn basis. ADDENDUM:  Chest x-ray and blood tests done on 1/20/2023 obtained and reviewed:  Chest x-ray was reported as showing mild emphysematous changes with no acute cardiopulmonary pathology. BUN 25, creatinine 1.36, potassium 4.3, sodium 136, GFR 56, LFT's normal. Total cholesterol 153, triglycerides 64, HDL 83, LDL 69, TSH normal, free T4 normal.        [facility]  [address]  [facility] [id number] [phone]

## 2023-09-30 ENCOUNTER — HOSPITAL ENCOUNTER (OUTPATIENT)
Age: 73
Setting detail: OBSERVATION
Discharge: HOME | End: 2023-10-01
Attending: EMERGENCY MEDICINE | Admitting: INTERNAL MEDICINE
Payer: MEDICARE

## 2023-09-30 ENCOUNTER — APPOINTMENT (OUTPATIENT)
Dept: GENERAL RADIOLOGY | Age: 73
End: 2023-09-30
Payer: MEDICARE

## 2023-09-30 DIAGNOSIS — R07.9 CHEST PAIN, UNSPECIFIED TYPE: Primary | ICD-10-CM

## 2023-09-30 DIAGNOSIS — I25.119 CORONARY ARTERY DISEASE WITH ANGINA PECTORIS, UNSPECIFIED VESSEL OR LESION TYPE, UNSPECIFIED WHETHER NATIVE OR TRANSPLANTED HEART (HCC): ICD-10-CM

## 2023-09-30 LAB
ALBUMIN SERPL-MCNC: 4 G/DL (ref 3.5–5.2)
ALP SERPL-CCNC: 61 U/L (ref 40–129)
ALT SERPL-CCNC: 30 U/L (ref 0–40)
ANION GAP SERPL CALCULATED.3IONS-SCNC: 9 MMOL/L (ref 7–16)
AST SERPL-CCNC: 30 U/L (ref 0–39)
BILIRUB DIRECT SERPL-MCNC: <0.2 MG/DL (ref 0–0.3)
BILIRUB INDIRECT SERPL-MCNC: NORMAL MG/DL (ref 0–1)
BILIRUB SERPL-MCNC: 0.7 MG/DL (ref 0–1.2)
BUN SERPL-MCNC: 23 MG/DL (ref 6–23)
CALCIUM SERPL-MCNC: 9 MG/DL (ref 8.6–10.2)
CHLORIDE SERPL-SCNC: 98 MMOL/L (ref 98–107)
CO2 SERPL-SCNC: 29 MMOL/L (ref 22–29)
CREAT SERPL-MCNC: 1.3 MG/DL (ref 0.7–1.2)
D DIMER: 221 NG/ML DDU (ref 0–232)
EKG ATRIAL RATE: 48 BPM
EKG P AXIS: 73 DEGREES
EKG P-R INTERVAL: 192 MS
EKG Q-T INTERVAL: 434 MS
EKG QRS DURATION: 92 MS
EKG QTC CALCULATION (BAZETT): 387 MS
EKG R AXIS: 51 DEGREES
EKG T AXIS: 50 DEGREES
EKG VENTRICULAR RATE: 48 BPM
ERYTHROCYTE [DISTWIDTH] IN BLOOD BY AUTOMATED COUNT: 12.2 % (ref 11.5–15)
GFR SERPL CREATININE-BSD FRML MDRD: 57 ML/MIN/1.73M2
GLUCOSE SERPL-MCNC: 97 MG/DL (ref 74–99)
HCT VFR BLD AUTO: 46 % (ref 37–54)
HGB BLD-MCNC: 15.2 G/DL (ref 12.5–16.5)
LIPASE SERPL-CCNC: 43 U/L (ref 13–60)
MCH RBC QN AUTO: 29.7 PG (ref 26–35)
MCHC RBC AUTO-ENTMCNC: 33 G/DL (ref 32–34.5)
MCV RBC AUTO: 90 FL (ref 80–99.9)
PLATELET # BLD AUTO: 223 K/UL (ref 130–450)
PMV BLD AUTO: 9.8 FL (ref 7–12)
POTASSIUM SERPL-SCNC: 4.3 MMOL/L (ref 3.5–5)
PROT SERPL-MCNC: 6.4 G/DL (ref 6.4–8.3)
RBC # BLD AUTO: 5.11 M/UL (ref 3.8–5.8)
SODIUM SERPL-SCNC: 136 MMOL/L (ref 132–146)
TROPONIN I SERPL HS-MCNC: 12 NG/L (ref 0–11)
TROPONIN I SERPL HS-MCNC: 12 NG/L (ref 0–11)
TROPONIN I SERPL HS-MCNC: 13 NG/L (ref 0–11)
TROPONIN I SERPL HS-MCNC: 14 NG/L (ref 0–11)
WBC OTHER # BLD: 6.7 K/UL (ref 4.5–11.5)

## 2023-09-30 PROCEDURE — G0378 HOSPITAL OBSERVATION PER HR: HCPCS

## 2023-09-30 PROCEDURE — 99285 EMERGENCY DEPT VISIT HI MDM: CPT

## 2023-09-30 PROCEDURE — 85027 COMPLETE CBC AUTOMATED: CPT

## 2023-09-30 PROCEDURE — 6360000002 HC RX W HCPCS: Performed by: INTERNAL MEDICINE

## 2023-09-30 PROCEDURE — 80053 COMPREHEN METABOLIC PANEL: CPT

## 2023-09-30 PROCEDURE — 83690 ASSAY OF LIPASE: CPT

## 2023-09-30 PROCEDURE — 6370000000 HC RX 637 (ALT 250 FOR IP)

## 2023-09-30 PROCEDURE — 96372 THER/PROPH/DIAG INJ SC/IM: CPT

## 2023-09-30 PROCEDURE — 71045 X-RAY EXAM CHEST 1 VIEW: CPT

## 2023-09-30 PROCEDURE — 84484 ASSAY OF TROPONIN QUANT: CPT

## 2023-09-30 PROCEDURE — 93010 ELECTROCARDIOGRAM REPORT: CPT | Performed by: INTERNAL MEDICINE

## 2023-09-30 PROCEDURE — 6370000000 HC RX 637 (ALT 250 FOR IP): Performed by: INTERNAL MEDICINE

## 2023-09-30 PROCEDURE — 93005 ELECTROCARDIOGRAM TRACING: CPT | Performed by: NURSE PRACTITIONER

## 2023-09-30 PROCEDURE — 85379 FIBRIN DEGRADATION QUANT: CPT

## 2023-09-30 PROCEDURE — 82248 BILIRUBIN DIRECT: CPT

## 2023-09-30 RX ORDER — ACETAMINOPHEN 650 MG/1
650 SUPPOSITORY RECTAL EVERY 6 HOURS PRN
Status: DISCONTINUED | OUTPATIENT
Start: 2023-09-30 | End: 2023-10-01 | Stop reason: HOSPADM

## 2023-09-30 RX ORDER — TIMOLOL MALEATE 5 MG/ML
1 SOLUTION/ DROPS OPHTHALMIC 2 TIMES DAILY
Status: DISCONTINUED | OUTPATIENT
Start: 2023-09-30 | End: 2023-10-01 | Stop reason: HOSPADM

## 2023-09-30 RX ORDER — ACETAMINOPHEN 325 MG/1
650 TABLET ORAL EVERY 6 HOURS PRN
Status: DISCONTINUED | OUTPATIENT
Start: 2023-09-30 | End: 2023-10-01 | Stop reason: HOSPADM

## 2023-09-30 RX ORDER — ASPIRIN 81 MG/1
TABLET, CHEWABLE ORAL
Status: COMPLETED
Start: 2023-09-30 | End: 2023-09-30

## 2023-09-30 RX ORDER — ASPIRIN 81 MG/1
81 TABLET, CHEWABLE ORAL ONCE
Status: DISCONTINUED | OUTPATIENT
Start: 2023-09-30 | End: 2023-09-30

## 2023-09-30 RX ORDER — LISINOPRIL 10 MG/1
10 TABLET ORAL DAILY
Status: DISCONTINUED | OUTPATIENT
Start: 2023-09-30 | End: 2023-10-01 | Stop reason: HOSPADM

## 2023-09-30 RX ORDER — LISINOPRIL 10 MG/1
10 TABLET ORAL DAILY
Status: ON HOLD | COMMUNITY
End: 2023-10-01 | Stop reason: HOSPADM

## 2023-09-30 RX ORDER — DORZOLAMIDE HCL 20 MG/ML
1 SOLUTION/ DROPS OPHTHALMIC EVERY 12 HOURS
Status: DISCONTINUED | OUTPATIENT
Start: 2023-09-30 | End: 2023-10-01 | Stop reason: HOSPADM

## 2023-09-30 RX ORDER — HYDROCHLOROTHIAZIDE 12.5 MG/1
12.5 TABLET ORAL DAILY
Status: DISCONTINUED | OUTPATIENT
Start: 2023-09-30 | End: 2023-10-01 | Stop reason: HOSPADM

## 2023-09-30 RX ORDER — LISINOPRIL AND HYDROCHLOROTHIAZIDE 12.5; 1 MG/1; MG/1
1 TABLET ORAL DAILY
Status: DISCONTINUED | OUTPATIENT
Start: 2023-09-30 | End: 2023-09-30 | Stop reason: CLARIF

## 2023-09-30 RX ORDER — ONDANSETRON 4 MG/1
4 TABLET, ORALLY DISINTEGRATING ORAL EVERY 8 HOURS PRN
Status: DISCONTINUED | OUTPATIENT
Start: 2023-09-30 | End: 2023-10-01 | Stop reason: HOSPADM

## 2023-09-30 RX ORDER — ONDANSETRON 2 MG/ML
4 INJECTION INTRAMUSCULAR; INTRAVENOUS EVERY 6 HOURS PRN
Status: DISCONTINUED | OUTPATIENT
Start: 2023-09-30 | End: 2023-10-01 | Stop reason: HOSPADM

## 2023-09-30 RX ORDER — ENOXAPARIN SODIUM 100 MG/ML
40 INJECTION SUBCUTANEOUS DAILY
Status: DISCONTINUED | OUTPATIENT
Start: 2023-09-30 | End: 2023-10-01 | Stop reason: HOSPADM

## 2023-09-30 RX ORDER — METOPROLOL SUCCINATE 25 MG/1
12.5 TABLET, EXTENDED RELEASE ORAL DAILY
Status: DISCONTINUED | OUTPATIENT
Start: 2023-09-30 | End: 2023-10-01 | Stop reason: HOSPADM

## 2023-09-30 RX ORDER — MELOXICAM 7.5 MG/1
15 TABLET ORAL DAILY
Status: DISCONTINUED | OUTPATIENT
Start: 2023-09-30 | End: 2023-10-01 | Stop reason: HOSPADM

## 2023-09-30 RX ORDER — ATORVASTATIN CALCIUM 40 MG/1
40 TABLET, FILM COATED ORAL NIGHTLY
Status: DISCONTINUED | OUTPATIENT
Start: 2023-09-30 | End: 2023-09-30

## 2023-09-30 RX ORDER — LANOLIN ALCOHOL/MO/W.PET/CERES
3 CREAM (GRAM) TOPICAL NIGHTLY PRN
Status: DISCONTINUED | OUTPATIENT
Start: 2023-09-30 | End: 2023-10-01 | Stop reason: HOSPADM

## 2023-09-30 RX ORDER — DORZOLAMIDE HYDROCHLORIDE AND TIMOLOL MALEATE 20; 5 MG/ML; MG/ML
1 SOLUTION/ DROPS OPHTHALMIC EVERY 12 HOURS SCHEDULED
Status: DISCONTINUED | OUTPATIENT
Start: 2023-09-30 | End: 2023-09-30 | Stop reason: CLARIF

## 2023-09-30 RX ORDER — ROSUVASTATIN CALCIUM 20 MG/1
40 TABLET, COATED ORAL EVERY EVENING
Status: DISCONTINUED | OUTPATIENT
Start: 2023-09-30 | End: 2023-10-01 | Stop reason: HOSPADM

## 2023-09-30 RX ORDER — ASPIRIN 81 MG/1
324 TABLET, CHEWABLE ORAL ONCE
Status: COMPLETED | OUTPATIENT
Start: 2023-09-30 | End: 2023-09-30

## 2023-09-30 RX ORDER — ASPIRIN 81 MG/1
81 TABLET, CHEWABLE ORAL DAILY
Status: DISCONTINUED | OUTPATIENT
Start: 2023-10-01 | End: 2023-10-01 | Stop reason: HOSPADM

## 2023-09-30 RX ORDER — LATANOPROST 50 UG/ML
1 SOLUTION/ DROPS OPHTHALMIC NIGHTLY
Status: DISCONTINUED | OUTPATIENT
Start: 2023-09-30 | End: 2023-10-01 | Stop reason: HOSPADM

## 2023-09-30 RX ORDER — ASPIRIN 81 MG/1
81 TABLET, CHEWABLE ORAL ONCE
Status: CANCELLED | OUTPATIENT
Start: 2023-09-30 | End: 2023-09-30

## 2023-09-30 RX ORDER — GABAPENTIN 400 MG/1
400 CAPSULE ORAL 3 TIMES DAILY
Status: DISCONTINUED | OUTPATIENT
Start: 2023-09-30 | End: 2023-10-01 | Stop reason: HOSPADM

## 2023-09-30 RX ADMIN — MELOXICAM 15 MG: 7.5 TABLET ORAL at 18:01

## 2023-09-30 RX ADMIN — DORZOLAMIDE HYDROCHLORIDE 1 DROP: 20 SOLUTION/ DROPS OPHTHALMIC at 20:51

## 2023-09-30 RX ADMIN — ENOXAPARIN SODIUM 40 MG: 100 INJECTION SUBCUTANEOUS at 17:43

## 2023-09-30 RX ADMIN — ROSUVASTATIN CALCIUM 40 MG: 20 TABLET, FILM COATED ORAL at 17:43

## 2023-09-30 RX ADMIN — TIMOLOL MALEATE 1 DROP: 5 SOLUTION OPHTHALMIC at 20:51

## 2023-09-30 RX ADMIN — ASPIRIN 81 MG CHEWABLE TABLET 324 MG: 81 TABLET CHEWABLE at 15:41

## 2023-09-30 RX ADMIN — GABAPENTIN 400 MG: 400 CAPSULE ORAL at 20:52

## 2023-09-30 RX ADMIN — ASPIRIN 324 MG: 81 TABLET, CHEWABLE ORAL at 15:41

## 2023-09-30 RX ADMIN — LATANOPROST 1 DROP: 50 SOLUTION OPHTHALMIC at 20:51

## 2023-09-30 ASSESSMENT — LIFESTYLE VARIABLES
HOW MANY STANDARD DRINKS CONTAINING ALCOHOL DO YOU HAVE ON A TYPICAL DAY: PATIENT DOES NOT DRINK
HOW OFTEN DO YOU HAVE A DRINK CONTAINING ALCOHOL: NEVER

## 2023-09-30 ASSESSMENT — PATIENT HEALTH QUESTIONNAIRE - PHQ9
SUM OF ALL RESPONSES TO PHQ QUESTIONS 1-9: 0
SUM OF ALL RESPONSES TO PHQ QUESTIONS 1-9: 0
SUM OF ALL RESPONSES TO PHQ9 QUESTIONS 1 & 2: 0
SUM OF ALL RESPONSES TO PHQ QUESTIONS 1-9: 0
SUM OF ALL RESPONSES TO PHQ QUESTIONS 1-9: 0
2. FEELING DOWN, DEPRESSED OR HOPELESS: 0
1. LITTLE INTEREST OR PLEASURE IN DOING THINGS: 0

## 2023-09-30 ASSESSMENT — PAIN DESCRIPTION - DESCRIPTORS
DESCRIPTORS: BURNING

## 2023-09-30 ASSESSMENT — PAIN DESCRIPTION - LOCATION
LOCATION: CHEST

## 2023-09-30 ASSESSMENT — PAIN SCALES - GENERAL
PAINLEVEL_OUTOF10: 6
PAINLEVEL_OUTOF10: 5
PAINLEVEL_OUTOF10: 5

## 2023-09-30 NOTE — H&P
09/30/2023    TROPHS 12 (H) 09/30/2023        Lab Results   Component Value Date    TSH 0.810 10/02/2011       Lab Results   Component Value Date    MG 2.2 01/29/2019       Recent Radiological Studies:  XR CHEST PORTABLE   Final Result   No acute process. Assessment  Active Hospital Problems    Diagnosis     Chest pain [R07.9]      Priority: High    Hyperlipemia [E78.5]     H/O acute myocardial infarction [I25.2]     CAD (coronary artery disease) [I25.10]     S/P angioplasty with stent [Z95.820]     Tobacco abuse [Z72.0]     HTN (hypertension) [I10]        Plan  Chest pain:   Telemetry  Cardiology consultation--for stress test today  CE's noted  ASA  Lipid panel noted    Continue home medications. Follow labs  DVT prophylaxis with Lovenox  Please see orders for further management and care.  for discharge planning  Discharge plan: TBD pending clinical improvement     Veronika Haydenp  10/1/2023  7:45 AM    I can be reached through [employer] [employer]. NOTE:  This report was transcribed using voice recognition software. Every effort was made to ensure accuracy; however, inadvertent computerized transcription errors may be present. Addendum: I have personally participated in a face-to-face history and physical exam on the date of service with the patient. I have discussed the case with the resident. I also participated in medical decision making with the resident on the date of service and I agree with all of the pertinent clinical information unless indicated in my editing of the note. I have reviewed and edited the note above based on my findings during my history, exam, and decision making on the same day of service.      My additional thoughts:   73 y/o male who presented w CP when cutting wood  He has a history of a MI  On exam, his pain is reproducible w/ palpation  Cardio consult  He likely needs a stress test  I think his sx are musculoskeletal though  I hope he can

## 2023-09-30 NOTE — ED NOTES
Department of Emergency Medicine  FIRST PROVIDER TRIAGE NOTE             Independent MLP           9/30/23  9:27 AM EDT    Date of Encounter: 9/30/23   MRN: [id number]      HPI: Erasto Chow is a 72 y.o. male who presents to the ED for Chest Pain (chest pain x 2 days)   CHEST PAIN THAT STARTED 2 DAYS AGO. PAIN IS IN THE CENTER OF HIS CHEST, AT TIMES RADIATES TO HIS NECK. STATES A FEW DAYS AGO HE GOT SWEATY AND DIZZY WHILE WORKING OUTSIDE. ROS: Negative for abd pain or back pain. PE: Gen Appearance/Constitutional: alert  HEENT: NC/NT. PERRLA,  Airway patent. Initial Plan of Care: All treatment areas with department are currently occupied. Plan to order/Initiate the following while awaiting opening in ED: EKG.   Initiate Treatment-Testing, Proceed toTreatment Area When Bed Available for ED Attending/MLP to Continue Care    Electronically signed by HÉCTOR Cortes CNP   DD: 9/30/23      HÉCTOR Cortes CNP  09/30/23 8500

## 2023-09-30 NOTE — PROGRESS NOTES
4 Eyes Skin Assessment     NAME:  Fito Gentile  YOB: 1950  MEDICAL RECORD NUMBER:  [id number]    The patient is being assessed for  Admission    I agree that at least one RN has performed a thorough Head to Toe Skin Assessment on the patient. ALL assessment sites listed below have been assessed. Areas assessed by both nurses:    Head, Face, Ears, Shoulders, Back, Chest, Arms, Elbows, Hands, Sacrum. Buttock, Coccyx, Ischium, Legs. Feet and Heels, and Under Medical Devices         Does the Patient have a Wound?  No noted wound(s)       Stephen Prevention initiated by RN: Yes  Wound Care Orders initiated by RN: No    Pressure Injury (Stage 3,4, Unstageable, DTI, NWPT, and Complex wounds) if present, place Wound referral order by RN under : No    New Ostomies, if present place, Ostomy referral order under : No     Nurse 1 eSignature: Electronically signed by Juan Morrison RN on 9/30/23 at 6:38 PM EDT    **SHARE this note so that the co-signing nurse can place an eSignature**    Nurse 2 eSignature: Electronically signed by Arline Mejía RN on 9/30/23 at 6:39 PM EDT

## 2023-09-30 NOTE — ED PROVIDER NOTES
10/1/23 0845)   regadenoson (LEXISCAN) injection 0.4 mg (has no administration in time range)   aspirin chewable tablet 324 mg (324 mg Oral Given 23 7501)             Medical Decision Making:       Angie Francisco is a 72 y.o. male presenting to the ED for midsternal chest pain , beginning several days ago. The complaint has been persistent, moderate in severity, and worsened by light exertion. Onset of pain when he was outside cutting wood yesterday. Pain is midsternal nonradiating. Patient is history of coronary artery disease. Said the stent placement in the past.  Also suffers from hypertension hyperlipidemia MI and tobacco abuse. History From: Patient     CC/HPI Summary, DDx, ED Course, Reassessment, Tests Considered, Patient expectation:     DDX:  ACS  PE  Atypical Chest pain       Social Determinants affecting Dx or Tx: NONE    Chronic Conditions: ACS CAD Stent placement     Records Reviewed: DELORIS  PCI stent placement         I am the Primary Clinician of Record. DISPOSITION: Admit to telemetry     ED COURSE:  ED Course as of 10/01/23 0926   Sat Sep 30, 2023   1134 EKG demonstrates a sinus bradycardia rate of 48 bpm QTc is 387 ms. No ischemic changes noted. EKG is unchanged from previous. This was reviewed interpreted by me Dr. Rebeca Canales. [JN]   1551 Patient's initial high-sensitivity troponin was 12 repeat troponins pending. Lipase was 43. D-dimer was 200. His chemistries and hepatic function as well as CBC were all normal. [JN]   1347 Discussed the work-up with Dr. Alondra smith admit to telemetry for chest pain.  [JN]      ED Course User Index  [JN] Krys Ellison DO         HEART Score For Major Cardiac Events  (Max Score 10 Points)  HISTORY       []   Slightly Suspicious  0       [x]   Moderately Suspicious  +1       []   Highly Suspicious  +2    EK point: No ST deviation but LBBB, LVH repolarization changes (ex:digoxin);  2 points: ST deviation not due to LBBB, LVH or digoxin

## 2023-10-01 ENCOUNTER — APPOINTMENT (OUTPATIENT)
Dept: NUCLEAR MEDICINE | Age: 73
End: 2023-10-01
Payer: MEDICARE

## 2023-10-01 VITALS
BODY MASS INDEX: 25.21 KG/M2 | SYSTOLIC BLOOD PRESSURE: 118 MMHG | DIASTOLIC BLOOD PRESSURE: 65 MMHG | OXYGEN SATURATION: 95 % | RESPIRATION RATE: 16 BRPM | HEIGHT: 67 IN | WEIGHT: 160.6 LBS | TEMPERATURE: 97.7 F | HEART RATE: 62 BPM

## 2023-10-01 LAB
ALBUMIN SERPL-MCNC: 3.7 G/DL (ref 3.5–5.2)
ALP SERPL-CCNC: 53 U/L (ref 40–129)
ALT SERPL-CCNC: 28 U/L (ref 0–40)
ANION GAP SERPL CALCULATED.3IONS-SCNC: 8 MMOL/L (ref 7–16)
AST SERPL-CCNC: 24 U/L (ref 0–39)
BILIRUB SERPL-MCNC: 0.5 MG/DL (ref 0–1.2)
BUN SERPL-MCNC: 22 MG/DL (ref 6–23)
CALCIUM SERPL-MCNC: 8.9 MG/DL (ref 8.6–10.2)
CHLORIDE SERPL-SCNC: 99 MMOL/L (ref 98–107)
CHOLEST SERPL-MCNC: 140 MG/DL
CO2 SERPL-SCNC: 25 MMOL/L (ref 22–29)
CREAT SERPL-MCNC: 1.2 MG/DL (ref 0.7–1.2)
ERYTHROCYTE [DISTWIDTH] IN BLOOD BY AUTOMATED COUNT: 12.2 % (ref 11.5–15)
GFR SERPL CREATININE-BSD FRML MDRD: >60 ML/MIN/1.73M2
GLUCOSE SERPL-MCNC: 97 MG/DL (ref 74–99)
HCT VFR BLD AUTO: 43.9 % (ref 37–54)
HDLC SERPL-MCNC: 57 MG/DL
HGB BLD-MCNC: 14.7 G/DL (ref 12.5–16.5)
LDLC SERPL CALC-MCNC: 69 MG/DL
MCH RBC QN AUTO: 29.5 PG (ref 26–35)
MCHC RBC AUTO-ENTMCNC: 33.5 G/DL (ref 32–34.5)
MCV RBC AUTO: 88.2 FL (ref 80–99.9)
PLATELET # BLD AUTO: 188 K/UL (ref 130–450)
PMV BLD AUTO: 9.4 FL (ref 7–12)
POTASSIUM SERPL-SCNC: 3.8 MMOL/L (ref 3.5–5)
PROT SERPL-MCNC: 6.2 G/DL (ref 6.4–8.3)
RBC # BLD AUTO: 4.98 M/UL (ref 3.8–5.8)
SODIUM SERPL-SCNC: 132 MMOL/L (ref 132–146)
TRIGL SERPL-MCNC: 71 MG/DL
VLDLC SERPL CALC-MCNC: 14 MG/DL
WBC OTHER # BLD: 5.8 K/UL (ref 4.5–11.5)

## 2023-10-01 PROCEDURE — 6370000000 HC RX 637 (ALT 250 FOR IP): Performed by: INTERNAL MEDICINE

## 2023-10-01 PROCEDURE — 85027 COMPLETE CBC AUTOMATED: CPT

## 2023-10-01 PROCEDURE — 93016 CV STRESS TEST SUPVJ ONLY: CPT | Performed by: INTERNAL MEDICINE

## 2023-10-01 PROCEDURE — APPSS60 APP SPLIT SHARED TIME 46-60 MINUTES: Performed by: PHYSICIAN ASSISTANT

## 2023-10-01 PROCEDURE — 78452 HT MUSCLE IMAGE SPECT MULT: CPT

## 2023-10-01 PROCEDURE — A9500 TC99M SESTAMIBI: HCPCS | Performed by: RADIOLOGY

## 2023-10-01 PROCEDURE — 93017 CV STRESS TEST TRACING ONLY: CPT

## 2023-10-01 PROCEDURE — 6370000000 HC RX 637 (ALT 250 FOR IP): Performed by: PHYSICIAN ASSISTANT

## 2023-10-01 PROCEDURE — 78452 HT MUSCLE IMAGE SPECT MULT: CPT | Performed by: INTERNAL MEDICINE

## 2023-10-01 PROCEDURE — 3430000000 HC RX DIAGNOSTIC RADIOPHARMACEUTICAL: Performed by: RADIOLOGY

## 2023-10-01 PROCEDURE — 93018 CV STRESS TEST I&R ONLY: CPT | Performed by: INTERNAL MEDICINE

## 2023-10-01 PROCEDURE — 80061 LIPID PANEL: CPT

## 2023-10-01 PROCEDURE — 80053 COMPREHEN METABOLIC PANEL: CPT

## 2023-10-01 PROCEDURE — 99222 1ST HOSP IP/OBS MODERATE 55: CPT | Performed by: INTERNAL MEDICINE

## 2023-10-01 PROCEDURE — G0378 HOSPITAL OBSERVATION PER HR: HCPCS

## 2023-10-01 PROCEDURE — 96372 THER/PROPH/DIAG INJ SC/IM: CPT

## 2023-10-01 PROCEDURE — 6360000002 HC RX W HCPCS: Performed by: INTERNAL MEDICINE

## 2023-10-01 RX ORDER — TETRAKIS(2-METHOXYISOBUTYLISOCYANIDE)COPPER(I) TETRAFLUOROBORATE 1 MG/ML
10 INJECTION, POWDER, LYOPHILIZED, FOR SOLUTION INTRAVENOUS
Status: COMPLETED | OUTPATIENT
Start: 2023-10-01 | End: 2023-10-01

## 2023-10-01 RX ORDER — ISOSORBIDE MONONITRATE 30 MG/1
30 TABLET, EXTENDED RELEASE ORAL DAILY
Status: DISCONTINUED | OUTPATIENT
Start: 2023-10-01 | End: 2023-10-01 | Stop reason: HOSPADM

## 2023-10-01 RX ORDER — LISINOPRIL AND HYDROCHLOROTHIAZIDE 12.5; 1 MG/1; MG/1
1 TABLET ORAL DAILY
Qty: 30 TABLET | Refills: 0 | Status: SHIPPED
Start: 2023-10-01

## 2023-10-01 RX ORDER — REGADENOSON 0.08 MG/ML
0.4 INJECTION, SOLUTION INTRAVENOUS
Status: DISCONTINUED | OUTPATIENT
Start: 2023-10-01 | End: 2023-10-01 | Stop reason: ALTCHOICE

## 2023-10-01 RX ORDER — TETRAKIS(2-METHOXYISOBUTYLISOCYANIDE)COPPER(I) TETRAFLUOROBORATE 1 MG/ML
30 INJECTION, POWDER, LYOPHILIZED, FOR SOLUTION INTRAVENOUS
Status: COMPLETED | OUTPATIENT
Start: 2023-10-01 | End: 2023-10-01

## 2023-10-01 RX ORDER — ISOSORBIDE MONONITRATE 30 MG/1
30 TABLET, EXTENDED RELEASE ORAL DAILY
Qty: 30 TABLET | Refills: 0 | Status: SHIPPED | OUTPATIENT
Start: 2023-10-02

## 2023-10-01 RX ADMIN — ASPIRIN 81 MG CHEWABLE TABLET 81 MG: 81 TABLET CHEWABLE at 08:44

## 2023-10-01 RX ADMIN — Medication 10 MILLICURIE: at 10:21

## 2023-10-01 RX ADMIN — TIMOLOL MALEATE 1 DROP: 5 SOLUTION OPHTHALMIC at 08:45

## 2023-10-01 RX ADMIN — GABAPENTIN 400 MG: 400 CAPSULE ORAL at 14:14

## 2023-10-01 RX ADMIN — HYDROCHLOROTHIAZIDE 12.5 MG: 12.5 TABLET ORAL at 08:43

## 2023-10-01 RX ADMIN — DORZOLAMIDE HYDROCHLORIDE 1 DROP: 20 SOLUTION/ DROPS OPHTHALMIC at 08:45

## 2023-10-01 RX ADMIN — ENOXAPARIN SODIUM 40 MG: 100 INJECTION SUBCUTANEOUS at 08:45

## 2023-10-01 RX ADMIN — MELOXICAM 15 MG: 7.5 TABLET ORAL at 08:43

## 2023-10-01 RX ADMIN — ROSUVASTATIN CALCIUM 40 MG: 20 TABLET, FILM COATED ORAL at 18:04

## 2023-10-01 RX ADMIN — ISOSORBIDE MONONITRATE 30 MG: 30 TABLET, EXTENDED RELEASE ORAL at 18:10

## 2023-10-01 RX ADMIN — GABAPENTIN 400 MG: 400 CAPSULE ORAL at 08:43

## 2023-10-01 RX ADMIN — LISINOPRIL 10 MG: 10 TABLET ORAL at 08:44

## 2023-10-01 RX ADMIN — METOPROLOL SUCCINATE 12.5 MG: 25 TABLET, EXTENDED RELEASE ORAL at 08:44

## 2023-10-01 RX ADMIN — Medication 30 MILLICURIE: at 10:21

## 2023-10-01 ASSESSMENT — PAIN DESCRIPTION - DESCRIPTORS: DESCRIPTORS: BURNING

## 2023-10-01 ASSESSMENT — PAIN DESCRIPTION - LOCATION: LOCATION: CHEST

## 2023-10-01 ASSESSMENT — PAIN SCALES - GENERAL: PAINLEVEL_OUTOF10: 5

## 2023-10-01 NOTE — CONSULTS
INPATIENT CARDIOLOGY CONSULT     Reason for Consult: Chest pain    Cardiologist: Dr. Jordyn Moreno    Requesting Physician: Dr. Sherwin Chavarria    Date of Consultation: 10/1/2023    HISTORY OF PRESENT ILLNESS:   Patient is a 72year old WM known to Dr. Daniela Barnes. Patient has a known past medical history of CAD/STEMI in 2011 s/p PCI/LINDA to the distal RCA, HTN, HLD, emphysema, former tobacco abuse, alcohol abuse, history of marked sinus bradycardia on low-dose beta-blocker therapy, CKD, cataracts, arthritis. OV Dr. Daniela Barnes 2/22/2023. Denies any cardiac complaints. Noted to have SB with HR 44 bpm during OV, asymptomatic. Toprol-XL was decreased to 12.5 mg daily. Continue to monitor heart rate and consider discontinue beta-blocker therapy if resting heart rate remains below 50. Patient presented to [facility] on September 30, 2023 with complaint of chest pain. Per patient, this past Wednesday he was outside cutting wood with a chainsaw and felt fine during the activity. Afterward, he bent over to clean the chainsaw, and began to feel severely lightheaded with diaphoresis. He admits to near syncope during this episode. He sat down, with resolution of the symptoms within approximately 10 minutes. He states he did not seek medical care at that time, and felt back to normal baseline afterwards. However, on the following day, he was sitting at home at rest, and began to notice new onset chest discomfort. The chest discomfort is located midsternally/L side of his chest, with occasional radiation to his L arm. He describes it as a burning sensation. He states the chest discomfort has remained constant since initial onset this past Thursday, never achieving complete resolution. Still with 1-2/10 chest discomfort during my time on the floor, but appears in no acute distress. He states that the discomfort is worsened by exertion and improved with rest.  Notes of his midsternal chest wall being mildly tender on palpation.   Denies

## 2023-10-01 NOTE — PROGRESS NOTES
Dr. Pascual Kaplan called pt at the bedside and let him know he is good to be discharged from his services.

## 2023-10-01 NOTE — PROCEDURES
Exercise Nuclear Stress Test:    Cardiologist: Dr. Myriam Marks EKG: SB, abnormal EKG. Indications for study: Chest pain    Exercise stress test was performed using the Viral Protocol  No chest pain  Exercise time: 7:56 min, METs: 10, MPHR: 95%, [facility] treadmill score: 8  No new arrhythmias  No EKG changes suggestive of stress induced ischemia  Average functional capacity  There was an appropriate BP and heart response to exercise and recovery  Low risk exercise stress test.   Nuclear images pending    Kristi Gonzales MD., [facility].    [facility]

## 2023-10-02 ENCOUNTER — TELEPHONE (OUTPATIENT)
Dept: ADMINISTRATIVE | Age: 73
End: 2023-10-02

## 2023-10-31 NOTE — TELEPHONE ENCOUNTER
Per Coco Zamarripa, pt is scheduled 11-20-23 with Dr Therese Garcia for [address] [place] [address]

## 2023-11-20 ENCOUNTER — OFFICE VISIT (OUTPATIENT)
Dept: CARDIOLOGY CLINIC | Age: 73
End: 2023-11-20

## 2023-11-20 VITALS
SYSTOLIC BLOOD PRESSURE: 108 MMHG | DIASTOLIC BLOOD PRESSURE: 52 MMHG | WEIGHT: 154 LBS | RESPIRATION RATE: 16 BRPM | HEIGHT: 67 IN | OXYGEN SATURATION: 98 % | BODY MASS INDEX: 24.17 KG/M2 | HEART RATE: 55 BPM

## 2023-11-20 DIAGNOSIS — I25.2 HISTORY OF ACUTE INFERIOR WALL MI: ICD-10-CM

## 2023-11-20 DIAGNOSIS — Z86.39 H/O HYPERCHOLESTEROLEMIA: ICD-10-CM

## 2023-11-20 DIAGNOSIS — R00.1 SINUS BRADYCARDIA: ICD-10-CM

## 2023-11-20 DIAGNOSIS — Z98.61 HISTORY OF PTCA: ICD-10-CM

## 2023-11-20 DIAGNOSIS — Z86.74 H/O CARDIAC ARREST: ICD-10-CM

## 2023-11-20 DIAGNOSIS — I25.10 CAD IN NATIVE ARTERY: Primary | ICD-10-CM

## 2023-11-20 DIAGNOSIS — M19.90 ARTHRITIS: ICD-10-CM

## 2023-11-20 DIAGNOSIS — Z79.899 ON STATIN THERAPY: ICD-10-CM

## 2023-11-20 DIAGNOSIS — R07.89 BURNING IN THE CHEST: ICD-10-CM

## 2023-11-20 DIAGNOSIS — I10 ESSENTIAL HYPERTENSION: ICD-10-CM

## 2023-11-20 DIAGNOSIS — N18.2 CKD (CHRONIC KIDNEY DISEASE) STAGE 2, GFR 60-89 ML/MIN: ICD-10-CM

## 2023-11-21 NOTE — PROGRESS NOTES
OFFICE VISIT        PRIMARY CARE PHYSICIAN:    Lor Miller MD         ALLERGIES / SENSITIVITIES:    Allergies   Allergen Reactions    Corticosteroids      ABDOMINAL CRAMPS. Pt states it was just one pill one time and has taken steroids without problems since then. REVIEWED MEDICATIONS:      Current Outpatient Medications:     lisinopril-hydroCHLOROthiazide (PRINZIDE;ZESTORETIC) 10-12.5 MG per tablet, Take 1 tablet by mouth daily Pt states he breaks tablet in half as he was instructed to do so by his cardiologist (Patient taking differently: Take 1 tablet by mouth daily), Disp: 30 tablet, Rfl: 0    isosorbide mononitrate (IMDUR) 30 MG extended release tablet, Take 1 tablet by mouth daily, Disp: 30 tablet, Rfl: 0    dorzolamide-timolol (COSOPT) 22.3-6.8 MG/ML ophthalmic solution, INSTILL 1 DROP INTO LEFT EYE TWICE DAILY, Disp: , Rfl:     metoprolol succinate (TOPROL XL) 25 MG extended release tablet, Take 0.5 tablets by mouth daily, Disp: 30 tablet, Rfl: 5    latanoprost (XALATAN) 0.005 % ophthalmic solution, Place 1 drop into the left eye nightly, Disp: , Rfl:     gabapentin (NEURONTIN) 400 MG capsule, Take 1 capsule by mouth 3 times daily. , Disp: , Rfl:     nitroGLYCERIN (NITROSTAT) 0.4 MG SL tablet, Place 1 tablet under the tongue every 5 minutes as needed for Chest pain, Disp: , Rfl:     cyanocobalamin 1000 MCG/ML injection, Inject into the muscle every 30 days , Disp: , Rfl:     rosuvastatin (CRESTOR) 40 MG tablet, Take 1 tablet by mouth every evening, Disp: , Rfl:     Aspirin (ASPIR-81 PO), Take 81 mg by mouth daily. , Disp: , Rfl:         S: REASON FOR VISIT:    Coronary artery disease. Samy Ashraf is a pleasant, 77-year-old male with cardiovascular history as described below. He was last seen by me in the office in 2/2023. He presented to the hospital on 9/30/2023 because of substernal chest burning sensation that lasted now he tells me a couple of days continuously prior to his hospitalization.

## 2024-05-02 ENCOUNTER — TELEPHONE (OUTPATIENT)
Dept: ADMINISTRATIVE | Age: 74
End: 2024-05-02

## 2024-05-02 NOTE — TELEPHONE ENCOUNTER
Please reach patient for scheduling 6 month follow up with Dr. Wolf. Patient can be reached at [phone]

## 2024-07-24 ENCOUNTER — OFFICE VISIT (OUTPATIENT)
Dept: CARDIOLOGY CLINIC | Age: 74
End: 2024-07-24
Payer: MEDICARE

## 2024-07-24 VITALS
HEIGHT: 67 IN | HEART RATE: 47 BPM | RESPIRATION RATE: 20 BRPM | WEIGHT: 154.2 LBS | BODY MASS INDEX: 24.2 KG/M2 | SYSTOLIC BLOOD PRESSURE: 118 MMHG | DIASTOLIC BLOOD PRESSURE: 62 MMHG | OXYGEN SATURATION: 96 %

## 2024-07-24 DIAGNOSIS — M19.90 ARTHRITIS: ICD-10-CM

## 2024-07-24 DIAGNOSIS — I25.10 CAD IN NATIVE ARTERY: Primary | ICD-10-CM

## 2024-07-24 DIAGNOSIS — Z98.61 HISTORY OF PTCA: ICD-10-CM

## 2024-07-24 DIAGNOSIS — Z79.899 ON STATIN THERAPY: ICD-10-CM

## 2024-07-24 DIAGNOSIS — Z86.39 H/O HYPERCHOLESTEROLEMIA: ICD-10-CM

## 2024-07-24 DIAGNOSIS — R00.1 SINUS BRADYCARDIA: ICD-10-CM

## 2024-07-24 DIAGNOSIS — N18.2 CKD (CHRONIC KIDNEY DISEASE) STAGE 2, GFR 60-89 ML/MIN: ICD-10-CM

## 2024-07-24 DIAGNOSIS — Z86.74 H/O CARDIAC ARREST: ICD-10-CM

## 2024-07-24 DIAGNOSIS — I10 ESSENTIAL HYPERTENSION: ICD-10-CM

## 2024-07-24 DIAGNOSIS — I25.2 HISTORY OF ACUTE INFERIOR WALL MI: ICD-10-CM

## 2024-07-24 LAB — TSH SERPL DL<=0.05 MIU/L-ACNC: 1.05 UIU/ML (ref 0.27–4.2)

## 2024-07-24 PROCEDURE — 1036F TOBACCO NON-USER: CPT | Performed by: INTERNAL MEDICINE

## 2024-07-24 PROCEDURE — 1123F ACP DISCUSS/DSCN MKR DOCD: CPT | Performed by: INTERNAL MEDICINE

## 2024-07-24 PROCEDURE — G8427 DOCREV CUR MEDS BY ELIG CLIN: HCPCS | Performed by: INTERNAL MEDICINE

## 2024-07-24 PROCEDURE — 3074F SYST BP LT 130 MM HG: CPT | Performed by: INTERNAL MEDICINE

## 2024-07-24 PROCEDURE — G8420 CALC BMI NORM PARAMETERS: HCPCS | Performed by: INTERNAL MEDICINE

## 2024-07-24 PROCEDURE — 99214 OFFICE O/P EST MOD 30 MIN: CPT | Performed by: INTERNAL MEDICINE

## 2024-07-24 PROCEDURE — 93000 ELECTROCARDIOGRAM COMPLETE: CPT | Performed by: INTERNAL MEDICINE

## 2024-07-24 PROCEDURE — 3017F COLORECTAL CA SCREEN DOC REV: CPT | Performed by: INTERNAL MEDICINE

## 2024-07-24 PROCEDURE — 36415 COLL VENOUS BLD VENIPUNCTURE: CPT | Performed by: INTERNAL MEDICINE

## 2024-07-24 PROCEDURE — 3078F DIAST BP <80 MM HG: CPT | Performed by: INTERNAL MEDICINE

## 2024-07-24 NOTE — PROGRESS NOTES
OFFICE VISIT        PRIMARY CARE PHYSICIAN:    Aki Pisano MD         ALLERGIES / SENSITIVITIES:    Allergies   Allergen Reactions    Corticosteroids      ABDOMINAL CRAMPS. Pt states it was just one pill one time and has taken steroids without problems since then.           REVIEWED MEDICATIONS:      Current Outpatient Medications:     lisinopril-hydroCHLOROthiazide (PRINZIDE;ZESTORETIC) 10-12.5 MG per tablet, Take 1 tablet by mouth daily Pt states he breaks tablet in half as he was instructed to do so by his cardiologist (Patient taking differently: Take 1 tablet by mouth daily), Disp: 30 tablet, Rfl: 0    isosorbide mononitrate (IMDUR) 30 MG extended release tablet, Take 1 tablet by mouth daily, Disp: 30 tablet, Rfl: 0    dorzolamide-timolol (COSOPT) 22.3-6.8 MG/ML ophthalmic solution, INSTILL 1 DROP INTO LEFT EYE TWICE DAILY, Disp: , Rfl:     latanoprost (XALATAN) 0.005 % ophthalmic solution, Place 1 drop into the left eye nightly, Disp: , Rfl:     gabapentin (NEURONTIN) 400 MG capsule, Take 1 capsule by mouth 3 times daily., Disp: , Rfl:     nitroGLYCERIN (NITROSTAT) 0.4 MG SL tablet, Place 1 tablet under the tongue every 5 minutes as needed for Chest pain, Disp: , Rfl:     cyanocobalamin 1000 MCG/ML injection, Inject into the muscle every 30 days Pt receives every 3 months, Disp: , Rfl:     rosuvastatin (CRESTOR) 40 MG tablet, Take 1 tablet by mouth every evening, Disp: , Rfl:     Aspirin (ASPIR-81 PO), Take 81 mg by mouth daily.  , Disp: , Rfl:         S: REASON FOR VISIT:    Coronary artery disease.  Terell is a pleasant 73-year-old male with cardiovascular stress as below.  He was last seen by me in the office in 11/2023.  He has remained stable from cardiac standpoint since and has had no emergency room visits or hospitalizations.  He has been very active cutting 6 trees that fell in his backyard.  He denies chest pain or dyspnea with his daily activities.  He denies orthopnea, PND's or lower extremity

## 2024-12-24 ENCOUNTER — APPOINTMENT (OUTPATIENT)
Dept: GENERAL RADIOLOGY | Age: 74
DRG: 684 | End: 2024-12-24
Payer: MEDICARE

## 2024-12-24 ENCOUNTER — HOSPITAL ENCOUNTER (INPATIENT)
Age: 74
LOS: 1 days | Discharge: HOME | DRG: 684 | End: 2024-12-25
Attending: EMERGENCY MEDICINE | Admitting: INTERNAL MEDICINE
Payer: MEDICARE

## 2024-12-24 DIAGNOSIS — R42 DIZZINESS: Primary | ICD-10-CM

## 2024-12-24 DIAGNOSIS — R55 NEAR SYNCOPE: ICD-10-CM

## 2024-12-24 PROBLEM — E86.0 DEHYDRATION: Status: ACTIVE | Noted: 2024-12-24

## 2024-12-24 PROBLEM — I95.9 HYPOTENSION: Status: ACTIVE | Noted: 2024-12-24

## 2024-12-24 PROBLEM — N17.9 AKI (ACUTE KIDNEY INJURY) (HCC): Status: ACTIVE | Noted: 2024-12-24

## 2024-12-24 LAB
ALBUMIN SERPL-MCNC: 4.1 G/DL (ref 3.5–5.2)
ALP SERPL-CCNC: 46 U/L (ref 40–129)
ALT SERPL-CCNC: 23 U/L (ref 0–40)
ANION GAP SERPL CALCULATED.3IONS-SCNC: 7 MMOL/L (ref 7–16)
AST SERPL-CCNC: 23 U/L (ref 0–39)
BASOPHILS # BLD: 0.02 K/UL (ref 0–0.2)
BASOPHILS NFR BLD: 0 % (ref 0–2)
BILIRUB SERPL-MCNC: 0.5 MG/DL (ref 0–1.2)
BILIRUB UR QL STRIP: NEGATIVE
BUN SERPL-MCNC: 35 MG/DL (ref 6–23)
CALCIUM SERPL-MCNC: 8.8 MG/DL (ref 8.6–10.2)
CHLORIDE SERPL-SCNC: 101 MMOL/L (ref 98–107)
CLARITY UR: CLEAR
CO2 SERPL-SCNC: 30 MMOL/L (ref 22–29)
COLOR UR: YELLOW
COMMENT: ABNORMAL
CREAT SERPL-MCNC: 1.5 MG/DL (ref 0.7–1.2)
EOSINOPHIL # BLD: 0.25 K/UL (ref 0.05–0.5)
EOSINOPHILS RELATIVE PERCENT: 5 % (ref 0–6)
ERYTHROCYTE [DISTWIDTH] IN BLOOD BY AUTOMATED COUNT: 12.7 % (ref 11.5–15)
GFR, ESTIMATED: 50 ML/MIN/1.73M2
GLUCOSE SERPL-MCNC: 128 MG/DL (ref 74–99)
GLUCOSE UR STRIP-MCNC: NEGATIVE MG/DL
HCT VFR BLD AUTO: 41.6 % (ref 37–54)
HGB BLD-MCNC: 13.5 G/DL (ref 12.5–16.5)
HGB UR QL STRIP.AUTO: NEGATIVE
IMM GRANULOCYTES # BLD AUTO: <0.03 K/UL (ref 0–0.58)
IMM GRANULOCYTES NFR BLD: 0 % (ref 0–5)
KETONES UR STRIP-MCNC: ABNORMAL MG/DL
LEUKOCYTE ESTERASE UR QL STRIP: NEGATIVE
LYMPHOCYTES NFR BLD: 1.02 K/UL (ref 1.5–4)
LYMPHOCYTES RELATIVE PERCENT: 18 % (ref 20–42)
MCH RBC QN AUTO: 29.4 PG (ref 26–35)
MCHC RBC AUTO-ENTMCNC: 32.5 G/DL (ref 32–34.5)
MCV RBC AUTO: 90.6 FL (ref 80–99.9)
MONOCYTES NFR BLD: 0.78 K/UL (ref 0.1–0.95)
MONOCYTES NFR BLD: 14 % (ref 2–12)
NEUTROPHILS NFR BLD: 62 % (ref 43–80)
NEUTS SEG NFR BLD: 3.47 K/UL (ref 1.8–7.3)
NITRITE UR QL STRIP: NEGATIVE
PH UR STRIP: 5.5 [PH] (ref 5–9)
PLATELET # BLD AUTO: 155 K/UL (ref 130–450)
PMV BLD AUTO: 9.2 FL (ref 7–12)
POTASSIUM SERPL-SCNC: 3.7 MMOL/L (ref 3.5–5)
PROT SERPL-MCNC: 5.9 G/DL (ref 6.4–8.3)
PROT UR STRIP-MCNC: NEGATIVE MG/DL
RBC # BLD AUTO: 4.59 M/UL (ref 3.8–5.8)
SODIUM SERPL-SCNC: 138 MMOL/L (ref 132–146)
SP GR UR STRIP: >1.03 (ref 1–1.03)
TROPONIN I SERPL HS-MCNC: 13 NG/L (ref 0–11)
TROPONIN I SERPL HS-MCNC: 14 NG/L (ref 0–11)
UROBILINOGEN UR STRIP-ACNC: 0.2 EU/DL (ref 0–1)
WBC OTHER # BLD: 5.6 K/UL (ref 4.5–11.5)

## 2024-12-24 PROCEDURE — 84484 ASSAY OF TROPONIN QUANT: CPT

## 2024-12-24 PROCEDURE — 93005 ELECTROCARDIOGRAM TRACING: CPT

## 2024-12-24 PROCEDURE — 2580000003 HC RX 258: Performed by: EMERGENCY MEDICINE

## 2024-12-24 PROCEDURE — 99285 EMERGENCY DEPT VISIT HI MDM: CPT

## 2024-12-24 PROCEDURE — 71045 X-RAY EXAM CHEST 1 VIEW: CPT

## 2024-12-24 PROCEDURE — 85025 COMPLETE CBC W/AUTO DIFF WBC: CPT

## 2024-12-24 PROCEDURE — 81003 URINALYSIS AUTO W/O SCOPE: CPT

## 2024-12-24 PROCEDURE — 2060000000 HC ICU INTERMEDIATE R&B

## 2024-12-24 PROCEDURE — 80053 COMPREHEN METABOLIC PANEL: CPT

## 2024-12-24 RX ORDER — 0.9 % SODIUM CHLORIDE 0.9 %
500 INTRAVENOUS SOLUTION INTRAVENOUS ONCE
Status: COMPLETED | OUTPATIENT
Start: 2024-12-24 | End: 2024-12-24

## 2024-12-24 RX ADMIN — SODIUM CHLORIDE 500 ML: 9 INJECTION, SOLUTION INTRAVENOUS at 22:04

## 2024-12-24 ASSESSMENT — PAIN - FUNCTIONAL ASSESSMENT: PAIN_FUNCTIONAL_ASSESSMENT: NONE - DENIES PAIN

## 2024-12-25 ENCOUNTER — APPOINTMENT (OUTPATIENT)
Dept: CT IMAGING | Age: 74
DRG: 684 | End: 2024-12-25
Payer: MEDICARE

## 2024-12-25 VITALS
OXYGEN SATURATION: 95 % | TEMPERATURE: 97.4 F | BODY MASS INDEX: 23.54 KG/M2 | SYSTOLIC BLOOD PRESSURE: 129 MMHG | WEIGHT: 150 LBS | HEIGHT: 67 IN | RESPIRATION RATE: 19 BRPM | DIASTOLIC BLOOD PRESSURE: 60 MMHG | HEART RATE: 59 BPM

## 2024-12-25 LAB — TROPONIN I SERPL HS-MCNC: 13 NG/L (ref 0–11)

## 2024-12-25 PROCEDURE — 70450 CT HEAD/BRAIN W/O DYE: CPT

## 2024-12-25 PROCEDURE — 6370000000 HC RX 637 (ALT 250 FOR IP): Performed by: NURSE PRACTITIONER

## 2024-12-25 PROCEDURE — 84484 ASSAY OF TROPONIN QUANT: CPT

## 2024-12-25 PROCEDURE — 6370000000 HC RX 637 (ALT 250 FOR IP): Performed by: INTERNAL MEDICINE

## 2024-12-25 PROCEDURE — 6360000002 HC RX W HCPCS: Performed by: NURSE PRACTITIONER

## 2024-12-25 PROCEDURE — 2500000003 HC RX 250 WO HCPCS: Performed by: NURSE PRACTITIONER

## 2024-12-25 PROCEDURE — 2580000003 HC RX 258: Performed by: NURSE PRACTITIONER

## 2024-12-25 RX ORDER — ACETAMINOPHEN 325 MG/1
650 TABLET ORAL EVERY 6 HOURS PRN
Status: DISCONTINUED | OUTPATIENT
Start: 2024-12-25 | End: 2024-12-25 | Stop reason: HOSPADM

## 2024-12-25 RX ORDER — LATANOPROST 50 UG/ML
1 SOLUTION/ DROPS OPHTHALMIC NIGHTLY
Status: DISCONTINUED | OUTPATIENT
Start: 2024-12-25 | End: 2024-12-25

## 2024-12-25 RX ORDER — ENOXAPARIN SODIUM 100 MG/ML
40 INJECTION SUBCUTANEOUS DAILY
Status: DISCONTINUED | OUTPATIENT
Start: 2024-12-25 | End: 2024-12-25 | Stop reason: HOSPADM

## 2024-12-25 RX ORDER — BISACODYL 5 MG/1
5 TABLET, DELAYED RELEASE ORAL DAILY PRN
Status: DISCONTINUED | OUTPATIENT
Start: 2024-12-25 | End: 2024-12-25 | Stop reason: HOSPADM

## 2024-12-25 RX ORDER — NITROGLYCERIN 0.4 MG/1
0.4 TABLET SUBLINGUAL EVERY 5 MIN PRN
Status: DISCONTINUED | OUTPATIENT
Start: 2024-12-25 | End: 2024-12-25 | Stop reason: HOSPADM

## 2024-12-25 RX ORDER — ISOSORBIDE MONONITRATE 30 MG/1
30 TABLET, EXTENDED RELEASE ORAL DAILY
Status: DISCONTINUED | OUTPATIENT
Start: 2024-12-25 | End: 2024-12-25 | Stop reason: HOSPADM

## 2024-12-25 RX ORDER — ASPIRIN 81 MG/1
81 TABLET ORAL DAILY
Status: DISCONTINUED | OUTPATIENT
Start: 2024-12-25 | End: 2024-12-25 | Stop reason: HOSPADM

## 2024-12-25 RX ORDER — GABAPENTIN 400 MG/1
400 CAPSULE ORAL 3 TIMES DAILY
Status: DISCONTINUED | OUTPATIENT
Start: 2024-12-25 | End: 2024-12-25 | Stop reason: HOSPADM

## 2024-12-25 RX ORDER — DORZOLAMIDE HYDROCHLORIDE AND TIMOLOL MALEATE 20; 5 MG/ML; MG/ML
1 SOLUTION/ DROPS OPHTHALMIC EVERY 12 HOURS SCHEDULED
Status: DISCONTINUED | OUTPATIENT
Start: 2024-12-25 | End: 2024-12-25 | Stop reason: CLARIF

## 2024-12-25 RX ORDER — SODIUM CHLORIDE 9 MG/ML
INJECTION, SOLUTION INTRAVENOUS CONTINUOUS
Status: DISCONTINUED | OUTPATIENT
Start: 2024-12-25 | End: 2024-12-25 | Stop reason: HOSPADM

## 2024-12-25 RX ORDER — ROSUVASTATIN CALCIUM 20 MG/1
40 TABLET, COATED ORAL NIGHTLY
Status: DISCONTINUED | OUTPATIENT
Start: 2024-12-25 | End: 2024-12-25 | Stop reason: HOSPADM

## 2024-12-25 RX ORDER — SODIUM CHLORIDE 0.9 % (FLUSH) 0.9 %
5-40 SYRINGE (ML) INJECTION EVERY 12 HOURS SCHEDULED
Status: DISCONTINUED | OUTPATIENT
Start: 2024-12-25 | End: 2024-12-25 | Stop reason: HOSPADM

## 2024-12-25 RX ORDER — LISINOPRIL AND HYDROCHLOROTHIAZIDE 10; 12.5 MG/1; MG/1
1 TABLET ORAL DAILY
Status: DISCONTINUED | OUTPATIENT
Start: 2024-12-25 | End: 2024-12-25

## 2024-12-25 RX ORDER — SODIUM CHLORIDE 0.9 % (FLUSH) 0.9 %
5-40 SYRINGE (ML) INJECTION PRN
Status: DISCONTINUED | OUTPATIENT
Start: 2024-12-25 | End: 2024-12-25 | Stop reason: HOSPADM

## 2024-12-25 RX ORDER — ACETAMINOPHEN 650 MG/1
650 SUPPOSITORY RECTAL EVERY 6 HOURS PRN
Status: DISCONTINUED | OUTPATIENT
Start: 2024-12-25 | End: 2024-12-25 | Stop reason: HOSPADM

## 2024-12-25 RX ORDER — TIMOLOL MALEATE 5 MG/ML
1 SOLUTION/ DROPS OPHTHALMIC EVERY 12 HOURS
Status: DISCONTINUED | OUTPATIENT
Start: 2024-12-25 | End: 2024-12-25 | Stop reason: HOSPADM

## 2024-12-25 RX ORDER — DORZOLAMIDE HCL 20 MG/ML
1 SOLUTION/ DROPS OPHTHALMIC EVERY 12 HOURS
Status: DISCONTINUED | OUTPATIENT
Start: 2024-12-25 | End: 2024-12-25 | Stop reason: HOSPADM

## 2024-12-25 RX ORDER — ONDANSETRON 4 MG/1
4 TABLET, ORALLY DISINTEGRATING ORAL EVERY 8 HOURS PRN
Status: DISCONTINUED | OUTPATIENT
Start: 2024-12-25 | End: 2024-12-25 | Stop reason: HOSPADM

## 2024-12-25 RX ORDER — LATANOPROST 50 UG/ML
1 SOLUTION/ DROPS OPHTHALMIC NIGHTLY
Status: DISCONTINUED | OUTPATIENT
Start: 2024-12-25 | End: 2024-12-25 | Stop reason: HOSPADM

## 2024-12-25 RX ORDER — SODIUM CHLORIDE 9 MG/ML
INJECTION, SOLUTION INTRAVENOUS PRN
Status: DISCONTINUED | OUTPATIENT
Start: 2024-12-25 | End: 2024-12-25 | Stop reason: HOSPADM

## 2024-12-25 RX ORDER — ONDANSETRON 2 MG/ML
4 INJECTION INTRAMUSCULAR; INTRAVENOUS EVERY 6 HOURS PRN
Status: DISCONTINUED | OUTPATIENT
Start: 2024-12-25 | End: 2024-12-25 | Stop reason: HOSPADM

## 2024-12-25 RX ADMIN — SODIUM CHLORIDE: 9 INJECTION, SOLUTION INTRAVENOUS at 08:19

## 2024-12-25 RX ADMIN — ROSUVASTATIN 40 MG: 20 TABLET, FILM COATED ORAL at 02:19

## 2024-12-25 RX ADMIN — DORZOLAMIDE HYDROCHLORIDE 1 DROP: 20 SOLUTION/ DROPS OPHTHALMIC at 10:13

## 2024-12-25 RX ADMIN — SODIUM CHLORIDE: 9 INJECTION, SOLUTION INTRAVENOUS at 02:21

## 2024-12-25 RX ADMIN — SODIUM CHLORIDE, PRESERVATIVE FREE 10 ML: 5 INJECTION INTRAVENOUS at 08:15

## 2024-12-25 RX ADMIN — ENOXAPARIN SODIUM 40 MG: 100 INJECTION SUBCUTANEOUS at 08:15

## 2024-12-25 RX ADMIN — ASPIRIN 81 MG: 81 TABLET, COATED ORAL at 08:11

## 2024-12-25 RX ADMIN — GABAPENTIN 400 MG: 400 CAPSULE ORAL at 08:12

## 2024-12-25 RX ADMIN — ISOSORBIDE MONONITRATE 30 MG: 30 TABLET, EXTENDED RELEASE ORAL at 08:11

## 2024-12-25 ASSESSMENT — PAIN - FUNCTIONAL ASSESSMENT
PAIN_FUNCTIONAL_ASSESSMENT: NONE - DENIES PAIN
PAIN_FUNCTIONAL_ASSESSMENT: NONE - DENIES PAIN

## 2024-12-25 NOTE — DISCHARGE SUMMARY
[facility]   Discharge summary   Patient ID:  Terell Sow  [id number]  74 y.o.  1950    Admit date: 12/24/2024    Discharge date and time: 12/25/2024    Patient admitted less than 48 hours.  Please see H&P   Patient is medically stable for discharge to home      Signed:  Yas Mei MD  12/25/2024  10:26 AM

## 2024-12-25 NOTE — ED PROVIDER NOTES
HPI:  12/24/24, Time: 9:10 PM DAWSON Sow is a 74 y.o. male history of alcohol use arthritis lumbar disc disease coronary disease hyperlipidemia hypertension myocardial infarction history of tobacco use V-fib presenting to the ED for low blood pressure, beginning short time ago.  The complaint has been persistent, moderate in severity, and worsened by nothing.  Patient was at a [employer] party reportedly was dizzy lightheaded.  Patient reportedly took 3 nitro.  He reports he was not having any chest pains or shortness of breath at the time.  Patient was noted to be hypotensive.  Patient reporting no abdominal pain no vomiting reports no headache he reports no fall he does report sensation of feeling he was get a pass out.  Patient reporting no calf pain no swelling.  Patient reporting no slurred speech    ROS:   Pertinent positives and negatives are stated within HPI, all other systems reviewed and are negative.  --------------------------------------------- PAST HISTORY ---------------------------------------------  Past Medical History:  has a past medical history of Alcohol use, Arthritis, Bulging lumbar disc, CAD (coronary artery disease), Glaucoma, Hyperlipemia, Hypertension, Myocardial infarction (HCC), S/P angioplasty with stent, Tobacco abuse, Tooth abscess, and Ventricular fibrillation.    Past Surgical History:  has a past surgical history that includes joint replacement; Diagnostic Cardiac Cath Lab Procedure (10/02/11); Coronary angioplasty (10/02/11); Cataract removal; cyst removal; and Elbow surgery (Right, 2012).    Social History:  reports that he quit smoking about 13 years ago. His smoking use included cigarettes. He started smoking about 55 years ago. He has a 42 pack-year smoking history. He has been exposed to tobacco smoke. He quit smokeless tobacco use about 40 years ago.  His smokeless tobacco use included chew. He reports current alcohol use of about 14.0 standard drinks of  12.7 11.5 - 15.0 %    Platelets 155 130 - 450 k/uL    MPV 9.2 7.0 - 12.0 fL    Neutrophils % 62 43.0 - 80.0 %    Lymphocytes % 18 (L) 20.0 - 42.0 %    Monocytes % 14 (H) 2.0 - 12.0 %    Eosinophils % 5 0 - 6 %    Basophils % 0 0.0 - 2.0 %    Immature Granulocytes % 0 0.0 - 5.0 %    Neutrophils Absolute 3.47 1.80 - 7.30 k/uL    Lymphocytes Absolute 1.02 (L) 1.50 - 4.00 k/uL    Monocytes Absolute 0.78 0.10 - 0.95 k/uL    Eosinophils Absolute 0.25 0.05 - 0.50 k/uL    Basophils Absolute 0.02 0.00 - 0.20 k/uL    Immature Granulocytes Absolute <0.03 0.00 - 0.58 k/uL   Comprehensive Metabolic Panel   Result Value Ref Range    Sodium 138 132 - 146 mmol/L    Potassium 3.7 3.5 - 5.0 mmol/L    Chloride 101 98 - 107 mmol/L    CO2 30 (H) 22 - 29 mmol/L    Anion Gap 7 7 - 16 mmol/L    Glucose 128 (H) 74 - 99 mg/dL    BUN 35 (H) 6 - 23 mg/dL    Creatinine 1.5 (H) 0.70 - 1.20 mg/dL    Est, Glom Filt Rate 50 (L) >60 mL/min/1.73m2    Calcium 8.8 8.6 - 10.2 mg/dL    Total Protein 5.9 (L) 6.4 - 8.3 g/dL    Albumin 4.1 3.5 - 5.2 g/dL    Total Bilirubin 0.5 0.0 - 1.2 mg/dL    Alkaline Phosphatase 46 40 - 129 U/L    ALT 23 0 - 40 U/L    AST 23 0 - 39 U/L   Troponin   Result Value Ref Range    Troponin, High Sensitivity 14 (H) 0 - 11 ng/L   Urinalysis   Result Value Ref Range    Color, UA Yellow Yellow    Turbidity UA Clear Clear    Glucose, Ur NEGATIVE NEGATIVE mg/dL    Bilirubin, Urine NEGATIVE NEGATIVE    Ketones, Urine TRACE (A) NEGATIVE mg/dL    Specific Gravity, UA >1.030 (H) 1.005 - 1.030    Urine Hgb NEGATIVE NEGATIVE    pH, Urine 5.5 5.0 - 9.0    Protein, UA NEGATIVE NEGATIVE mg/dL    Urobilinogen, Urine 0.2 0.0 - 1.0 EU/dL    Nitrite, Urine NEGATIVE NEGATIVE    Leukocyte Esterase, Urine NEGATIVE NEGATIVE    Comment       Microscopic exam not performed based on chemical results unless requested in original order.   Troponin   Result Value Ref Range    Troponin, High Sensitivity 13 (H) 0 - 11 ng/L   EKG 12 Lead   Result Value Ref

## 2024-12-25 NOTE — PLAN OF CARE
Problem: Discharge Planning  Goal: Discharge to home or other facility with appropriate resources  12/25/2024 0929 by Dipti Howe, RN  Outcome: Progressing  12/25/2024 0929 by Dipti Howe, RN  Outcome: Progressing  12/25/2024 0612 by Osmin Marvin RN  Outcome: Progressing     Problem: Cardiovascular - Adult  Goal: Maintains optimal cardiac output and hemodynamic stability  Outcome: Progressing  Goal: Absence of cardiac dysrhythmias or at baseline  Outcome: Progressing     Problem: Safety - Adult  Goal: Free from fall injury  Outcome: Progressing

## 2024-12-25 NOTE — H&P
[facility]  History and Physical      CHIEF COMPLAINT:    Chief Complaint   Patient presents with    Dizziness     Patient complaining of dizziness.  States that he got very sweaty and dizzy at a party.  Denies chest pain.  Hx of MI.        Patient of Aki Pisano MD presents with:  CODI (acute kidney injury) ([facility])    History of Present Illness:   Patient is a 74-year-old male with past medical history of EtOH abuse, CAD, HLD, HTN, MI status post stenting who presents to the emergency room for dizziness with near syncope.  Patient had been at a [employer] party and reportedly felt dizzy and lightheaded so he took 3 nitro however denying any chest pain or shortness of breath and was found to be hypotensive.  On arrival chest x-ray was obtained revealing no acute findings.  Labs on arrival revealed mildly elevated BUN/creatinine of 35/1.5.  BPs were soft on arrival at 92/50.  Patient is admitted to [facility] with telemetry for further workup and treatment.   On evaluation resting comfortably in no acute distress.  Has been up and ambulating in the room without any acute issues.  He was concerned that he was having a heart attack and therefore came into the emergency room.  He had taken nitroglycerin at home once again thinking he was having a heart attack which caused his blood pressure to drop.  On my evaluation he has absolutely no discomfort no chest pain and asking to be discharged.  Troponins are normal and flat, EKG unremarkable.    REVIEW OF SYSTEMS:  Pertinent negatives are above in HPI.  10 point ROS otherwise negative.      Past Medical History:   Diagnosis Date    Alcohol use     MILD-TO-MODERATE    Arthritis     Bulging lumbar disc 2021    CAD (coronary artery disease)     Glaucoma 2022    Hyperlipemia     Hypertension     Myocardial infarction (HCC) 10/02/11    [facility] MI    S/P angioplasty with stent 10-    Ion 3.0 x 32mm to distal RCA    Tobacco abuse     FORMER     months  rosuvastatin (CRESTOR) 40 MG tablet, Take 1 tablet by mouth every evening  Aspirin (ASPIR-81 PO), Take 81 mg by mouth daily.      Note that the patient's home medications were reviewed and the above list is accurate to the best of my knowledge at the time of the exam.    Allergies:    Corticosteroids    Social History:    reports that he quit smoking about 13 years ago. His smoking use included cigarettes. He started smoking about 55 years ago. He has a 42 pack-year smoking history. He has been exposed to tobacco smoke. He quit smokeless tobacco use about 40 years ago.  His smokeless tobacco use included chew. He reports current alcohol use of about 14.0 standard drinks of alcohol per week. He reports that he does not use drugs.    Family History:   family history includes Alzheimer's Disease in his mother; Hypertension in his father; Prostate Cancer in his father.      PHYSICAL EXAM:    Vitals:  /60   Pulse 59   Temp 98 °F (36.7 °C) (Oral)   Resp 21   Ht 1.702 m (5' 7\")   Wt 68 kg (150 lb)   SpO2 94%   BMI 23.49 kg/m²       General appearance: NAD, conversant  Eyes: Sclerae anicteric, PERRLA  HEENT: AT/NC, MMM  Neck: FROM, supple, no thyromegaly  Lymph: No cervical / supraclavicular lymphadenopathy  Lungs: Clear to auscultation, WOB normal  CV: RRR, no MRGs, no lower extremity edema  Abdomen: Soft, non-tender; no masses or HSM, +BS  Extremities: FROM without synovitis.  No clubbing or cyanosis of the hands.  Skin: no rash, induration, lesions, or ulcers  Psych: Calm and cooperative.  Normal judgement and insight.  Normal mood and affect.  Neuro: Alert and interactive, face symmetric, speech fluent.    LABS:  All labs reviewed.  Of note:  CBC:   Lab Results   Component Value Date/Time    WBC 5.6 12/24/2024 09:15 PM    RBC 4.59 12/24/2024 09:15 PM    HGB 13.5 12/24/2024 09:15 PM    HCT 41.6 12/24/2024 09:15 PM    MCV 90.6 12/24/2024 09:15 PM    MCH 29.4 12/24/2024 09:15 PM    MCHC 32.5 12/24/2024

## 2024-12-25 NOTE — PROGRESS NOTES
4 Eyes Skin Assessment     NAME:  Terell Sow  YOB: 1950  MEDICAL RECORD NUMBER:  [id number]    The patient is being assessed for  Admission    I agree that at least one RN has performed a thorough Head to Toe Skin Assessment on the patient. ALL assessment sites listed below have been assessed.      Areas assessed by both nurses:    Head, Face, Ears, Shoulders, Back, Chest, Arms, Elbows, Hands, Sacrum. Buttock, Coccyx, Ischium, Legs. Feet and Heels, and Under Medical Devices         Does the Patient have a Wound? No noted wound(s)       Stephen Prevention initiated by RN: No  Wound Care Orders initiated by RN: No    Pressure Injury (Stage 3,4, Unstageable, DTI, NWPT, and Complex wounds) if present, place Wound referral order by RN under : No    New Ostomies, if present place, Ostomy referral order under : No     Nurse 1 eSignature: Electronically signed by Osmin Marvin RN on 12/25/24 at 6:17 AM EST    **SHARE this note so that the co-signing nurse can place an eSignature**    Nurse 2 eSignature: {Esignature:[id number]}

## 2024-12-25 NOTE — PROGRESS NOTES
Patient discharged with personal belongings. Discharge papers reviewed with patient; questions answered. IV removed; dressing applied.

## 2024-12-28 LAB
EKG ATRIAL RATE: 59 BPM
EKG P AXIS: 71 DEGREES
EKG P-R INTERVAL: 174 MS
EKG Q-T INTERVAL: 402 MS
EKG QRS DURATION: 92 MS
EKG QTC CALCULATION (BAZETT): 397 MS
EKG R AXIS: 41 DEGREES
EKG T AXIS: 49 DEGREES
EKG VENTRICULAR RATE: 59 BPM

## 2025-01-11 NOTE — PROGRESS NOTES
Physician Progress Note      PATIENT:               BENIGNO SEVILLA  CSN #:                  [id number]  :                       1950  ADMIT DATE:       2024 9:15 PM  DISCH DATE:        2024 11:19 AM  RESPONDING  PROVIDER #:        Yas Mei MD          QUERY TEXT:    Patient admitted with CODI. Noted documentation of Acute Kidney Injury in H&P   on .  In order to support the diagnosis of CODI, please include additional   clinical indicators in your documentation.? Or please document if the   diagnosis of CODI has been ruled out after further study.  The medical record reflects the following:  Risk Factors: Age- 74 years old, lisinopril medication  Clinical Indicators: H&P on  states: \"admitted to [facility] for CODI   with near syncope and hypotension - Insignificant troponin and creatinine,   hold nephrotoxic agents-resume at home, Continue IVF NS at 77\".  Creatinine: 24 - 1.5  Treatment: IVF, monitor labs, Hold nephrotoxic agents    Thank you,  Nya Abreu, [facility], S.    Defined by Kidney Disease Improving Global Outcomes (KDIGO) clinical practice   guideline for acute kidney injury:  -Increase in SCr by greater than or equal to 0.3 mg/dl within 48 hours; or  -Increase or decrease in SCr to greater than or equal to 1.5 times baseline,   which is known or presumed to have occurred within the prior 7 days; or  -Urine volume < 0.5ml/kg/h for 6 hours.  Options provided:  -- Acute kidney injury evidenced by, Please document evidence as well as a   numerical baseline creatinine, if known.  -- Acute kidney injury ruled out after study  -- Other - I will add my own diagnosis  -- Disagree - Not applicable / Not valid  -- Disagree - Clinically unable to determine / Unknown  -- Refer to Clinical Documentation Reviewer    PROVIDER RESPONSE TEXT:    Acute kidney injury was ruled out after study.    Query created by: Nya Abreu on 2025 1:18 AM      Electronically signed

## 2025-01-15 NOTE — PROGRESS NOTES
Physician Progress Note      PATIENT:               BENIGNO SEVILLA  CSN #:                  [id number]  :                       1950  ADMIT DATE:       2024 9:15 PM  DISCH DATE:        2024 11:19 AM  RESPONDING  PROVIDER #:        Yas Mei MD          QUERY TEXT:    Pt admitted with Syncope. Pt noted to have Hypotension. If possible, please   document in progress notes and discharge summary the relationship, if any,   between Syncope and Hypotension.    The medical record reflects the following:  Risk Factors: Age 74, CAD  Clinical Indicators: ED on : presenting to the ED for low blood pressure   and was found to be hypotensive. H&P on  Patient presents to the   emergency room for dizziness with near syncope.  The patient had been at a   [employer] party and reportedly felt dizzy and lightheaded and was found to be   hypotensive. Patient admitted to intermediate for CODI with near syncope and   hypotension\".  BP 92/50>>116/59>>97/56>>96/60>>101/57>>113/58>>105/58>>109/60>>129/60  Treatment: 0.9 % sodium chloride infusion, ordered orthostatic Bps, Monitor   I's and O's    Thank you,  Nya Abreu, [facility], [facility].  Options provided:  -- Syncope due to hypotension  -- Syncope unrelated to hypotension  -- Other - I will add my own diagnosis  -- Disagree - Not applicable / Not valid  -- Disagree - Clinically unable to determine / Unknown  -- Refer to Clinical Documentation Reviewer    PROVIDER RESPONSE TEXT:    This patient has Syncope due to hypotension.    Query created by: Nya Abreu on 1/15/2025 4:20 AM      Electronically signed by:  Yas Mie MD 1/15/2025 6:58 AM

## 2025-08-22 ENCOUNTER — OFFICE VISIT (OUTPATIENT)
Dept: CARDIOLOGY CLINIC | Age: 75
End: 2025-08-22
Payer: MEDICARE

## 2025-08-22 VITALS
SYSTOLIC BLOOD PRESSURE: 122 MMHG | RESPIRATION RATE: 18 BRPM | BODY MASS INDEX: 23.23 KG/M2 | HEIGHT: 67 IN | DIASTOLIC BLOOD PRESSURE: 72 MMHG | HEART RATE: 46 BPM | WEIGHT: 148 LBS

## 2025-08-22 DIAGNOSIS — M19.90 ARTHRITIS: ICD-10-CM

## 2025-08-22 DIAGNOSIS — I10 ESSENTIAL HYPERTENSION: ICD-10-CM

## 2025-08-22 DIAGNOSIS — Z86.39 H/O HYPERCHOLESTEROLEMIA: ICD-10-CM

## 2025-08-22 DIAGNOSIS — N18.2 CKD (CHRONIC KIDNEY DISEASE) STAGE 2, GFR 60-89 ML/MIN: ICD-10-CM

## 2025-08-22 DIAGNOSIS — I25.2 HISTORY OF ACUTE INFERIOR WALL MI: ICD-10-CM

## 2025-08-22 DIAGNOSIS — Z86.74 H/O CARDIAC ARREST: ICD-10-CM

## 2025-08-22 DIAGNOSIS — Z98.61 HISTORY OF PTCA: ICD-10-CM

## 2025-08-22 DIAGNOSIS — Z79.899 ON STATIN THERAPY: ICD-10-CM

## 2025-08-22 DIAGNOSIS — I25.10 CAD IN NATIVE ARTERY: Primary | ICD-10-CM

## 2025-08-22 DIAGNOSIS — R00.1 SINUS BRADYCARDIA: ICD-10-CM

## 2025-08-22 PROCEDURE — 1123F ACP DISCUSS/DSCN MKR DOCD: CPT | Performed by: INTERNAL MEDICINE

## 2025-08-22 PROCEDURE — 1160F RVW MEDS BY RX/DR IN RCRD: CPT | Performed by: INTERNAL MEDICINE

## 2025-08-22 PROCEDURE — 1159F MED LIST DOCD IN RCRD: CPT | Performed by: INTERNAL MEDICINE

## 2025-08-22 PROCEDURE — G8420 CALC BMI NORM PARAMETERS: HCPCS | Performed by: INTERNAL MEDICINE

## 2025-08-22 PROCEDURE — 99214 OFFICE O/P EST MOD 30 MIN: CPT | Performed by: INTERNAL MEDICINE

## 2025-08-22 PROCEDURE — 1036F TOBACCO NON-USER: CPT | Performed by: INTERNAL MEDICINE

## 2025-08-22 PROCEDURE — 93000 ELECTROCARDIOGRAM COMPLETE: CPT | Performed by: INTERNAL MEDICINE

## 2025-08-22 PROCEDURE — G8427 DOCREV CUR MEDS BY ELIG CLIN: HCPCS | Performed by: INTERNAL MEDICINE

## 2025-08-22 PROCEDURE — 3017F COLORECTAL CA SCREEN DOC REV: CPT | Performed by: INTERNAL MEDICINE

## 2025-08-22 PROCEDURE — 3078F DIAST BP <80 MM HG: CPT | Performed by: INTERNAL MEDICINE

## 2025-08-22 PROCEDURE — 3074F SYST BP LT 130 MM HG: CPT | Performed by: INTERNAL MEDICINE
